# Patient Record
Sex: FEMALE | Race: WHITE | Employment: FULL TIME | URBAN - METROPOLITAN AREA
[De-identification: names, ages, dates, MRNs, and addresses within clinical notes are randomized per-mention and may not be internally consistent; named-entity substitution may affect disease eponyms.]

---

## 2019-02-11 ENCOUNTER — HOSPITAL ENCOUNTER (OUTPATIENT)
Age: 59
Setting detail: OUTPATIENT SURGERY
Discharge: HOME | End: 2019-02-11
Attending: COLON & RECTAL SURGERY | Admitting: COLON & RECTAL SURGERY
Payer: COMMERCIAL

## 2019-02-11 ENCOUNTER — ANESTHESIA EVENT (OUTPATIENT)
Dept: ENDOSCOPY | Age: 59
End: 2019-02-11
Payer: COMMERCIAL

## 2019-02-11 ENCOUNTER — ANESTHESIA (OUTPATIENT)
Dept: ENDOSCOPY | Age: 59
End: 2019-02-11
Payer: COMMERCIAL

## 2019-02-11 VITALS
TEMPERATURE: 97.8 F | WEIGHT: 150 LBS | HEIGHT: 62 IN | BODY MASS INDEX: 27.6 KG/M2 | DIASTOLIC BLOOD PRESSURE: 62 MMHG | RESPIRATION RATE: 16 BRPM | OXYGEN SATURATION: 100 % | SYSTOLIC BLOOD PRESSURE: 112 MMHG | HEART RATE: 56 BPM

## 2019-02-11 PROCEDURE — 76060000031 HC ANESTHESIA FIRST 0.5 HR: Performed by: COLON & RECTAL SURGERY

## 2019-02-11 PROCEDURE — 74011250636 HC RX REV CODE- 250/636

## 2019-02-11 PROCEDURE — 76040000019: Performed by: COLON & RECTAL SURGERY

## 2019-02-11 RX ORDER — LIDOCAINE HYDROCHLORIDE 20 MG/ML
INJECTION, SOLUTION EPIDURAL; INFILTRATION; INTRACAUDAL; PERINEURAL AS NEEDED
Status: DISCONTINUED | OUTPATIENT
Start: 2019-02-11 | End: 2019-02-11 | Stop reason: HOSPADM

## 2019-02-11 RX ORDER — NALOXONE HYDROCHLORIDE 0.4 MG/ML
0.4 INJECTION, SOLUTION INTRAMUSCULAR; INTRAVENOUS; SUBCUTANEOUS
Status: ACTIVE | OUTPATIENT
Start: 2019-02-11 | End: 2019-02-11

## 2019-02-11 RX ORDER — DEXTROMETHORPHAN/PSEUDOEPHED 2.5-7.5/.8
1.2 DROPS ORAL
Status: DISCONTINUED | OUTPATIENT
Start: 2019-02-11 | End: 2019-02-12 | Stop reason: HOSPADM

## 2019-02-11 RX ORDER — SODIUM CHLORIDE 0.9 % (FLUSH) 0.9 %
5-40 SYRINGE (ML) INJECTION AS NEEDED
Status: DISCONTINUED | OUTPATIENT
Start: 2019-02-11 | End: 2019-02-12 | Stop reason: HOSPADM

## 2019-02-11 RX ORDER — ACETAMINOPHEN 500 MG
TABLET ORAL
COMMUNITY
End: 2021-12-10

## 2019-02-11 RX ORDER — PROPOFOL 10 MG/ML
INJECTION, EMULSION INTRAVENOUS AS NEEDED
Status: DISCONTINUED | OUTPATIENT
Start: 2019-02-11 | End: 2019-02-11 | Stop reason: HOSPADM

## 2019-02-11 RX ORDER — LEVOTHYROXINE SODIUM 75 UG/1
TABLET ORAL
COMMUNITY
End: 2021-12-10 | Stop reason: SDUPTHER

## 2019-02-11 RX ORDER — ATROPINE SULFATE 0.1 MG/ML
0.5 INJECTION INTRAVENOUS
Status: DISCONTINUED | OUTPATIENT
Start: 2019-02-11 | End: 2019-02-12 | Stop reason: HOSPADM

## 2019-02-11 RX ORDER — FLUMAZENIL 0.1 MG/ML
0.2 INJECTION INTRAVENOUS
Status: ACTIVE | OUTPATIENT
Start: 2019-02-11 | End: 2019-02-11

## 2019-02-11 RX ORDER — SODIUM CHLORIDE 0.9 % (FLUSH) 0.9 %
5-40 SYRINGE (ML) INJECTION EVERY 8 HOURS
Status: DISCONTINUED | OUTPATIENT
Start: 2019-02-11 | End: 2019-02-12 | Stop reason: HOSPADM

## 2019-02-11 RX ORDER — PROPOFOL 10 MG/ML
INJECTION, EMULSION INTRAVENOUS
Status: DISCONTINUED | OUTPATIENT
Start: 2019-02-11 | End: 2019-02-11 | Stop reason: HOSPADM

## 2019-02-11 RX ADMIN — PROPOFOL 13 MG: 10 INJECTION, EMULSION INTRAVENOUS at 08:51

## 2019-02-11 RX ADMIN — PROPOFOL 100 MG: 10 INJECTION, EMULSION INTRAVENOUS at 08:50

## 2019-02-11 RX ADMIN — PROPOFOL 40 MG: 10 INJECTION, EMULSION INTRAVENOUS at 09:01

## 2019-02-11 RX ADMIN — LIDOCAINE HYDROCHLORIDE 100 MG: 20 INJECTION, SOLUTION EPIDURAL; INFILTRATION; INTRACAUDAL; PERINEURAL at 08:53

## 2019-02-11 RX ADMIN — PROPOFOL 100 MCG/KG/MIN: 10 INJECTION, EMULSION INTRAVENOUS at 08:50

## 2019-02-11 NOTE — PROCEDURES
Jean Paul Peterson [address]  PROCEDURE NOTE    Name:  Fredo Officer  MR#:  [id number]  :  1960  ACCOUNT #:  [de-identified]  DATE OF SERVICE:  2019    PREOPERATIVE DIAGNOSIS:  History of colon cancer. POSTOPERATIVE DIAGNOSIS:  Diverticulosis. PROCEDURE PERFORMED:  Colonoscopy. SURGEON:  Sabine Bradshaw MD    ASSISTANT:  Aicha Armendariz. ANESTHESIA:  MAC.    ESTIMATED BLOOD LOSS:  None. SPECIMENS REMOVED:  None. COMPLICATIONS:  None. IMPLANTS:  None. INDICATIONS:  The patient is 59year-old. She comes for surveillance because of  positive family history. PROCEDURE:  After mechanical bowel prep and intravenous sedation 320 mg of propofol,  the Olymposcope was introduced into the rectum and advanced under direct  visualization to the cecum. The ileocecal valve and cecal cap were well visualized. Good bowel prep. The scope was slowly withdrawn and mucosa inspected. No polyps or  dysplasia was seen. Minimal diverticulosis of the sigmoid without complication. The  rectum was normal.  The air was aspirated and the patient tolerated the procedure  well. There is minimal diverticulosis with positive family history of colon cancer. Recommend repeat in 5 years.       Agata Cardenas MD      [id number]  D:  2019 9:25  T:  2019 13:36  JOB #:  [id number]

## 2019-02-11 NOTE — ANESTHESIA POSTPROCEDURE EVALUATION
Procedure(s):  COLONOSCOPY.     Anesthesia Post Evaluation      Multimodal analgesia: multimodal analgesia not used between 6 hours prior to anesthesia start to PACU discharge  Patient location during evaluation: PACU  Patient participation: complete - patient participated  Level of consciousness: awake  Pain management: adequate  Airway patency: patent  Anesthetic complications: no  Cardiovascular status: acceptable, blood pressure returned to baseline and hemodynamically stable  Respiratory status: acceptable  Hydration status: acceptable        Visit Vitals  /54   Pulse (!) 58   Temp 36.6 °C (97.8 °F)   Resp 17   Ht 5' 2\" (1.575 m)   Wt 68 kg (150 lb)   SpO2 98%   BMI 27.44 kg/m²

## 2019-02-11 NOTE — H&P
Gastroenterology Outpatient History and Physical    Patient: Amy Lagos    Physician: Adella Siemens, MD    Vital Signs: Blood pressure 138/72, pulse 77, temperature 99.7 °F (37.6 °C), resp. rate 17, height 5' 2\" (1.575 m), weight 68 kg (150 lb), SpO2 100 %. Allergies: Allergies   Allergen Reactions    Iodinated Contrast- Oral And Iv Dye Rash       Chief Complaint: positive family history    History of Present Illness: as above    Justification for Procedure: as above    History:  Past Medical History:   Diagnosis Date    Ill-defined condition     Kidney stones    Thyroid disease       Past Surgical History:   Procedure Laterality Date    HX GYN      2009 D/C      Social History     Socioeconomic History    Marital status:      Spouse name: Not on file    Number of children: Not on file    Years of education: Not on file    Highest education level: Not on file   Tobacco Use    Smoking status: Former Smoker    Smokeless tobacco: Never Used   Substance and Sexual Activity    Alcohol use: Yes     Comment: occationally    Drug use: No    History reviewed. No pertinent family history. Medications:   Prior to Admission medications    Medication Sig Start Date End Date Taking? Authorizing Provider   levothyroxine (SYNTHROID) 75 mcg tablet Take  by mouth Daily (before breakfast). Yes Provider, Historical   acetaminophen (TYLENOL EXTRA STRENGTH) 500 mg tablet Take  by mouth every six (6) hours as needed for Pain. Yes Provider, Historical       Physical Exam:   General: alert, no distress   HEENT: Head: Normocephalic, no lesions, without obvious abnormality.    Heart: regular rate and rhythm, S1, S2 normal, no murmur, click, rub or gallop   Lungs: chest clear, no wheezing, rales, normal symmetric air entry   Abdominal: Bowel sounds are normal, liver is not enlarged, spleen is not enlarged   Neurological: Grossly normal   Extremities: extremities normal, atraumatic, no cyanosis or edema Findings/Diagnosis: as above    Plan of Care/Planned Procedure: colonoscopy    Signed By: Richie Reyes MD     February 11, 2019

## 2019-02-11 NOTE — ROUTINE PROCESS
[facility]  1960  [id number]    Situation:  Verbal report received from: Walker Manifold  Procedure: Procedure(s):  COLONOSCOPY    Background:    Preoperative diagnosis: SCREENING  Postoperative diagnosis: Diverticulosis    :  Dr. Sergio Rosas  Assistant(s): Endoscopy RN-1: Damien Yeh RN  Endoscopy RN-2: Chad Estrada RN    Specimens: * No specimens in log *  H. Pylori  no    Assessment:  Intra-procedure medications   Anesthesia gave intra-procedure sedation and medications, see anesthesia flow sheet yes    Intravenous fluids: NS@ KVO     Vital signs stable     Abdominal assessment: round and soft     Recommendation:  Discharge patient per MD order.   Return to floor  Family or Friend   Permission to share finding with family or friend yes

## 2019-02-11 NOTE — ANESTHESIA PREPROCEDURE EVALUATION
Anesthetic History   No history of anesthetic complications            Review of Systems / Medical History  Patient summary reviewed, nursing notes reviewed and pertinent labs reviewed    Pulmonary  Within defined limits                 Neuro/Psych   Within defined limits           Cardiovascular  Within defined limits                Exercise tolerance: >4 METS     GI/Hepatic/Renal  Within defined limits       Renal disease: stones       Endo/Other  Within defined limits    Hypothyroidism       Other Findings              Physical Exam    Airway  Mallampati: II    Neck ROM: normal range of motion   Mouth opening: Normal     Cardiovascular  Regular rate and rhythm,  S1 and S2 normal,  no murmur, click, rub, or gallop  Rhythm: regular  Rate: normal         Dental  No notable dental hx       Pulmonary  Breath sounds clear to auscultation               Abdominal  GI exam deferred       Other Findings            Anesthetic Plan    ASA: 2  Anesthesia type: MAC          Induction: Intravenous  Anesthetic plan and risks discussed with: Patient

## 2019-02-11 NOTE — PROCEDURES
[facility] Brief Procedure Note    Pia Chacon  1960  [id number]    Date of Procedure: 2/11/2019    Preoperative diagnosis: SCREENING    Postoperative diagnosis: Diverticulosis    Procedure: Procedure(s):  COLONOSCOPY    :  Dr. Anahi Sherman MD    Assistant(s): Endoscopy RN-1: Dilcia Crawford RN  Endoscopy RN-2: Carter Avila RN    EBL:None    Anesthesia/Sedation: Moderate sedation / MAC    Specimens: * No specimens in log *    Findings: diverticulosis    Complications: None    Dr. Anahi Sherman MD  2/11/2019  9:11 AM

## 2019-02-11 NOTE — DISCHARGE INSTRUCTIONS
Cheko Jalloh  [id number]  1960    COLON DISCHARGE INSTRUCTIONS  Discomfort:  Redness at IV site- apply warm compress to area; if redness or soreness persist- contact your physician  There may be a slight amount of blood passed from the rectum  Gaseous discomfort- walking, belching will help relieve any discomfort  You may not operate a vehicle for 12 hours  You may not engage in an occupation involving machinery or appliances for rest of today  You may not drink alcoholic beverages for at least 12 hours  Avoid making any critical decisions for at least 24 hour  DIET:   High fiber diet. - however -  remember your colon is empty and a heavy meal will produce gas. Avoid these foods:  vegetables, fried / greasy foods, carbonated drinks for today    MEDICATIONS:           ACTIVITY:  You may resume your normal daily activities it is recommended that you spend the remainder of the day resting -  avoid any strenuous activity. CALL M.D. ANY SIGN OF:   Increasing pain, nausea, vomiting  Abdominal distension (swelling)  New increased bleeding (oral or rectal)  Fever (chills)  Pain in chest area  Bloody discharge from nose or mouth  Shortness of breath     Follow-up Instructions:   Call Aditya Medina MD if any questions or problems. Telephone # [phone]  Should have a repeat colonoscopy in 5 years. COLONOSCOPY FINDINGS:  Your colonoscopy showed: diverticulosis.

## 2019-09-03 ENCOUNTER — HOSPITAL ENCOUNTER (OUTPATIENT)
Dept: MRI IMAGING | Age: 59
Discharge: HOME | End: 2019-09-03
Attending: ORTHOPAEDIC SURGERY
Payer: COMMERCIAL

## 2019-09-03 DIAGNOSIS — S83.242A ACUTE MEDIAL MENISCAL TEAR, LEFT, INITIAL ENCOUNTER: ICD-10-CM

## 2019-09-03 PROCEDURE — 73721 MRI JNT OF LWR EXTRE W/O DYE: CPT

## 2020-06-19 ENCOUNTER — HOSPITAL ENCOUNTER (OUTPATIENT)
Dept: MAMMOGRAPHY | Age: 60
Discharge: HOME | End: 2020-06-19
Attending: OBSTETRICS & GYNECOLOGY
Payer: COMMERCIAL

## 2020-06-19 DIAGNOSIS — Z12.31 VISIT FOR SCREENING MAMMOGRAM: ICD-10-CM

## 2020-06-19 PROCEDURE — 77063 BREAST TOMOSYNTHESIS BI: CPT

## 2020-07-16 ENCOUNTER — NURSE TRIAGE (OUTPATIENT)
Dept: OTHER | Facility: CLINIC | Age: 60
End: 2020-07-16

## 2020-07-16 NOTE — TELEPHONE ENCOUNTER
Reason for Disposition   [1] COVID-19 infection suspected by caller or triager AND [2] mild symptoms (cough, fever, or others) AND [5] no complications or SOB    Answer Assessment - Initial Assessment Questions  1. COVID-19 DIAGNOSIS: \"Who made your Coronavirus (COVID-19) diagnosis? \" \"Was it confirmed by a positive lab test?\" If not diagnosed by a HCP, ask \"Are there lots of cases (community spread) where you live? \" (See public health department website, if unsure)      n/a  2. ONSET: \"When did the COVID-19 symptoms start? \"       Last Tuesday  3. WORST SYMPTOM: \"What is your worst symptom? \" (e.g., cough, fever, shortness of breath, muscle aches)      Headache  4. COUGH: \"Do you have a cough? \" If so, ask: \"How bad is the cough? \"        No cough  5. FEVER: \"Do you have a fever? \" If so, ask: \"What is your temperature, how was it measured, and when did it start? \"      No fever  6. RESPIRATORY STATUS: \"Describe your breathing? \" (e.g., shortness of breath, wheezing, unable to speak)       No breathing problems  7. BETTER-SAME-WORSE: Arminda Clayton you getting better, staying the same or getting worse compared to yesterday? \"  If getting worse, ask, \"In what way? \"      same  8. HIGH RISK DISEASE: \"Do you have any chronic medical problems? \" (e.g., asthma, heart or lung disease, weak immune system, etc.)      none  9. PREGNANCY: \"Is there any chance you are pregnant? \" \"When was your last menstrual period? \"      no  10. OTHER SYMPTOMS: \"Do you have any other symptoms? \"  (e.g., chills, fatigue, headache, loss of smell or taste, muscle pain, sore throat)        Headache, fatigue,  Loss of taste    Protocols used: CORONAVIRUS (COVID-19) DIAGNOSED OR SUSPECTED-ADULT-AH  Patient/employee c/o of loss of taste, fatigue and headache. Recommended patient contact provider. Provided Occupational Health provider information and Associate Health Nurse information. Encourged patient to remain in contact with her direct supervisor.   Care advice provided.

## 2020-09-18 ENCOUNTER — EMPLOYEE WELLNESS (OUTPATIENT)
Dept: OTHER | Facility: CLINIC | Age: 60
End: 2020-09-18

## 2020-09-19 LAB
CHOLEST SERPL-MCNC: 195 MG/DL
GLUCOSE SERPL-MCNC: 97 MG/DL (ref 65–100)
HDLC SERPL-MCNC: 53 MG/DL
LDLC SERPL CALC-MCNC: 120.4 MG/DL (ref 0–100)
TRIGL SERPL-MCNC: 108 MG/DL (ref ?–150)

## 2021-06-21 LAB
CHOLEST SERPL-MCNC: 202 MG/DL
GLUCOSE SERPL-MCNC: 96 MG/DL (ref 65–100)
HDLC SERPL-MCNC: 55 MG/DL
LDLC SERPL CALC-MCNC: 124.6 MG/DL (ref 0–100)
TRIGL SERPL-MCNC: 112 MG/DL (ref ?–150)

## 2021-10-04 ENCOUNTER — TRANSCRIBE ORDER (OUTPATIENT)
Dept: SCHEDULING | Age: 61
End: 2021-10-04

## 2021-10-04 DIAGNOSIS — Z12.31 SCREENING MAMMOGRAM FOR HIGH-RISK PATIENT: Primary | ICD-10-CM

## 2021-10-08 ENCOUNTER — HOSPITAL ENCOUNTER (OUTPATIENT)
Dept: MAMMOGRAPHY | Age: 61
Discharge: HOME | End: 2021-10-08
Attending: OBSTETRICS & GYNECOLOGY
Payer: COMMERCIAL

## 2021-10-08 DIAGNOSIS — Z12.31 SCREENING MAMMOGRAM FOR HIGH-RISK PATIENT: ICD-10-CM

## 2021-10-08 PROCEDURE — 77063 BREAST TOMOSYNTHESIS BI: CPT

## 2021-10-08 PROCEDURE — 77067 SCR MAMMO BI INCL CAD: CPT

## 2021-10-11 ENCOUNTER — TELEPHONE (OUTPATIENT)
Dept: INTERNAL MEDICINE CLINIC | Age: 61
End: 2021-10-11

## 2021-10-11 NOTE — TELEPHONE ENCOUNTER
Patient had to cancel her new patient appointment for 10/19 and would like to know if the doctor can see her sometime in December instead. PSR told her he would reach out to the nurse as right now nothing is available until February.

## 2021-10-18 NOTE — TELEPHONE ENCOUNTER
PSR lvm for patient letting them know when the doctor was next available for a new patient appointment and to call us to schedule if she wanted the appointment.

## 2021-12-10 ENCOUNTER — OFFICE VISIT (OUTPATIENT)
Dept: INTERNAL MEDICINE CLINIC | Age: 61
End: 2021-12-10
Payer: COMMERCIAL

## 2021-12-10 VITALS
HEIGHT: 62 IN | DIASTOLIC BLOOD PRESSURE: 82 MMHG | TEMPERATURE: 98.1 F | OXYGEN SATURATION: 98 % | WEIGHT: 145 LBS | HEART RATE: 64 BPM | SYSTOLIC BLOOD PRESSURE: 145 MMHG | BODY MASS INDEX: 26.68 KG/M2 | RESPIRATION RATE: 14 BRPM

## 2021-12-10 DIAGNOSIS — G89.29 CHRONIC PAIN OF LEFT KNEE: ICD-10-CM

## 2021-12-10 DIAGNOSIS — J06.9 UPPER RESPIRATORY TRACT INFECTION, UNSPECIFIED TYPE: Primary | ICD-10-CM

## 2021-12-10 DIAGNOSIS — M25.562 CHRONIC PAIN OF LEFT KNEE: ICD-10-CM

## 2021-12-10 DIAGNOSIS — E03.9 ACQUIRED HYPOTHYROIDISM: ICD-10-CM

## 2021-12-10 DIAGNOSIS — Z00.00 ROUTINE ADULT HEALTH MAINTENANCE: ICD-10-CM

## 2021-12-10 DIAGNOSIS — Z11.59 NEED FOR HEPATITIS C SCREENING TEST: ICD-10-CM

## 2021-12-10 DIAGNOSIS — D22.9 MULTIPLE NEVI: ICD-10-CM

## 2021-12-10 DIAGNOSIS — R03.0 ELEVATED BLOOD PRESSURE READING: ICD-10-CM

## 2021-12-10 DIAGNOSIS — E55.9 VITAMIN D DEFICIENCY: ICD-10-CM

## 2021-12-10 PROBLEM — Z87.442 HISTORY OF KIDNEY STONES: Status: ACTIVE | Noted: 2021-12-10

## 2021-12-10 PROBLEM — I10 PRIMARY HYPERTENSION: Status: ACTIVE | Noted: 2021-12-10

## 2021-12-10 PROCEDURE — 99204 OFFICE O/P NEW MOD 45 MIN: CPT | Performed by: INTERNAL MEDICINE

## 2021-12-10 RX ORDER — LEVOTHYROXINE SODIUM 75 UG/1
75 TABLET ORAL
Qty: 90 TABLET | Refills: 3 | Status: SHIPPED | OUTPATIENT
Start: 2021-12-10

## 2021-12-10 RX ORDER — AMOXICILLIN AND CLAVULANATE POTASSIUM 875; 125 MG/1; MG/1
1 TABLET, FILM COATED ORAL EVERY 12 HOURS
COMMUNITY
Start: 2021-12-04 | End: 2022-09-14 | Stop reason: ALTCHOICE

## 2021-12-10 RX ORDER — ERGOCALCIFEROL 1.25 MG/1
CAPSULE ORAL
COMMUNITY
Start: 2021-09-09 | End: 2022-03-14

## 2021-12-10 NOTE — PATIENT INSTRUCTIONS
Monitor your blood pressure at home after you're feeling better. Send us a message in a month with the readings (or give us a call).

## 2021-12-10 NOTE — ASSESSMENT & PLAN NOTE
Went to [facility] last weekend  COVID test negative  Given augmentin  Feeling better, sore throat is gone, sinus congestion better but still present  Augmentin makes her tired so was only to take 1 tablet yesterday and today. Finish out antibiotics over the weekend then stop.   If symptoms recur, advised to let us know so we can change her antibiotics

## 2021-12-10 NOTE — ASSESSMENT & PLAN NOTE
Noted high at [facility]  High at dentist   Has had high BP at doc offices in the past   Family history of HTN  She would like to avoid medications if possible. Recommend monitoring at home after she recovers from her illness and sending us a message with her blood pressure readings in a month. If elevated, we discussed medications including amlodipine, losartan, hydrochlorothiazide. She would like to avoid hydrochlorothiazide due to diuretic effect.

## 2021-12-10 NOTE — ASSESSMENT & PLAN NOTE
unclear control, continue current medications pending work up below   levothyrox 75mcg   Takes it on an empty stomach   Advised to get labs done next month when she is no longer sick for more accurate lab tests

## 2021-12-10 NOTE — PROGRESS NOTES
Assessment and Plan     1. Upper respiratory tract infection, unspecified type  Assessment & Plan:   Went to [facility] last weekend  COVID test negative  Given augmentin  Feeling better, sore throat is gone, sinus congestion better but still present  Augmentin makes her tired so was only to take 1 tablet yesterday and today. Finish out antibiotics over the weekend then stop. If symptoms recur, advised to let us know so we can change her antibiotics  2. Vitamin D deficiency  Assessment & Plan:   unclear control, continue current medications pending work up below   On ergocalciferol weekly  Orders:  -     VITAMIN D, 25 HYDROXY; Future  3. Acquired hypothyroidism  Assessment & Plan:  unclear control, continue current medications pending work up below   levothyrox 75mcg   Takes it on an empty stomach   Advised to get labs done next month when she is no longer sick for more accurate lab tests  Orders:  -     CBC W/O DIFF; Future  -     METABOLIC PANEL, COMPREHENSIVE; Future  -     HEMOGLOBIN A1C WITH EAG; Future  -     LIPID PANEL; Future  -     TSH 3RD GENERATION; Future  -     levothyroxine (SYNTHROID) 75 mcg tablet; Take 1 Tablet by mouth Daily (before breakfast). Indications: low thyroid, Normal, Disp-90 Tablet, R-3  4. Need for hepatitis C screening test  -     HEPATITIS C AB; Future  5. Elevated blood pressure reading  Assessment & Plan:  Noted high at [facility]  High at dentist   Has had high BP at doc offices in the past   Family history of HTN  She would like to avoid medications if possible. Recommend monitoring at home after she recovers from her illness and sending us a message with her blood pressure readings in a month. If elevated, we discussed medications including amlodipine, losartan, hydrochlorothiazide. She would like to avoid hydrochlorothiazide due to diuretic effect. 6. Chronic pain of left knee  Assessment & Plan: Torn meniscus  monitor  7.  Routine adult health maintenance  Assessment & Plan:  Last pap over a year ago - followed by [facility]. Plans to possibly switch to here  Discussed shingles vaccine   Thinks had TDAP in the past 10 years   Walks a lot at work, tries to eat healthy   8. Multiple nevi  Assessment & Plan:  Followed by dermatology. Has had moles removed previously       Benefits, risks, possible drug interactions, and side effects of all new medications were reviewed with the patient. Pt verbalized understanding. Return to clinic:  Pending BP readings    An electronic signature was used to authenticate this note. Prem Daly MD  [facility]  12/10/2021    No future appointments. History of Present Illness   Chief Complaint   Establish care    Che Washington is a 59 y.o. female         Review of Systems   Constitutional: Negative for chills and fever. HENT: Negative for hearing loss. Eyes: Negative for blurred vision. Respiratory: Negative for shortness of breath. Cardiovascular: Negative for chest pain. Gastrointestinal: Negative for abdominal pain, blood in stool, constipation, diarrhea, melena, nausea and vomiting. Genitourinary: Negative for dysuria and hematuria. Musculoskeletal: Positive for joint pain. Skin: Negative for rash. Neurological: Negative for headaches. Past Medical History     Allergies   Allergen Reactions    Iodinated Contrast Media Rash        Current Outpatient Medications   Medication Sig    ergocalciferol (ERGOCALCIFEROL) 1,250 mcg (50,000 unit) capsule TAKE 1 CAPSULE BY MOUTH 1 TIME A WEEK    amoxicillin-clavulanate (AUGMENTIN) 875-125 mg per tablet Take 1 Tablet by mouth every twelve (12) hours.  levothyroxine (SYNTHROID) 75 mcg tablet Take 1 Tablet by mouth Daily (before breakfast). Indications: low thyroid     No current facility-administered medications for this visit.           Patient Active Problem List   Diagnosis Code    Vitamin D deficiency E55.9    Acquired hypothyroidism E03.9    URI (upper respiratory infection) J06.9    History of kidney stones Z87.442    Elevated blood pressure reading R03.0    Chronic pain of left knee M25.562, G89.29    Routine adult health maintenance Z00.00    Multiple nevi D22.9     Past Surgical History:   Procedure Laterality Date    COLONOSCOPY N/A 2/11/2019    COLONOSCOPY performed by Myriam Rogers MD at [facility] HX CYST REMOVAL Left 2000    cyst removel between heart and lung    HX GYN      2009 D/C      Social History     Tobacco Use    Smoking status: Never Smoker    Smokeless tobacco: Never Used   Substance Use Topics    Alcohol use: Yes     Comment: 2 drinks/week      Family History   Problem Relation Age of Onset    Breast Cancer Mother 79    Hypertension Mother     High Cholesterol Mother     Heart Disease Father         history of rheumatic fever    Hypertension Father     Hypertension Sister     Hypertension Brother     Diabetes Neg Hx     Stroke Neg Hx     Heart Attack Neg Hx         Physical Exam   Vitals:       Visit Vitals  BP (!) 145/82 (BP 1 Location: Left upper arm, BP Patient Position: Sitting, BP Cuff Size: Adult)   Pulse 64   Temp 98.1 °F (36.7 °C) (Temporal)   Resp 14   Ht 5' 2\" (1.575 m)   Wt 145 lb (65.8 kg)   SpO2 98%   BMI 26.52 kg/m²        Physical Exam  Constitutional:       General: She is not in acute distress. Appearance: She is well-developed. HENT:      Right Ear: Tympanic membrane, ear canal and external ear normal.      Left Ear: Tympanic membrane, ear canal and external ear normal.   Eyes:      Extraocular Movements: Extraocular movements intact. Conjunctiva/sclera: Conjunctivae normal.   Neck:      Vascular: No carotid bruit. Cardiovascular:      Rate and Rhythm: Normal rate and regular rhythm. Pulses: Normal pulses. Heart sounds: No murmur heard. No friction rub. No gallop. Pulmonary:      Effort: No respiratory distress.       Breath sounds: No wheezing, rhonchi or rales. Abdominal:      General: Bowel sounds are normal. There is no distension. Palpations: Abdomen is soft. There is no hepatomegaly, splenomegaly or mass. Tenderness: There is no abdominal tenderness. There is no guarding. Musculoskeletal:      Cervical back: Neck supple. Right lower leg: No edema. Left lower leg: No edema. Lymphadenopathy:      Cervical: No cervical adenopathy. Skin:     General: Skin is warm. Findings: No rash. Neurological:      Mental Status: She is alert.

## 2021-12-10 NOTE — ASSESSMENT & PLAN NOTE
Last pap over a year ago - followed by [facility].  Plans to possibly switch to here  Discussed shingles vaccine   Thinks had TDAP in the past 10 years   Walks a lot at work, tries to eat healthy

## 2022-03-11 ENCOUNTER — TELEPHONE (OUTPATIENT)
Dept: INTERNAL MEDICINE CLINIC | Age: 62
End: 2022-03-11

## 2022-03-11 RX ORDER — AMOXICILLIN AND CLAVULANATE POTASSIUM 875; 125 MG/1; MG/1
1 TABLET, FILM COATED ORAL EVERY 12 HOURS
Qty: 30 TABLET | Refills: 0 | OUTPATIENT
Start: 2022-03-11

## 2022-03-11 NOTE — TELEPHONE ENCOUNTER
Patient called to state her pharmacy would like to speak with our office before they can refill her vitamin D medication. Patient also states she thinks she may need a refill of her thyroid medication and to make sure it is the [employer] brand.

## 2022-03-14 RX ORDER — ERGOCALCIFEROL 1.25 MG/1
CAPSULE ORAL
Qty: 4 CAPSULE | Refills: 0 | Status: SHIPPED | OUTPATIENT
Start: 2022-03-14 | End: 2022-04-10

## 2022-03-14 RX ORDER — ERGOCALCIFEROL 1.25 MG/1
CAPSULE ORAL
Qty: 30 CAPSULE | Refills: 0 | Status: CANCELLED | OUTPATIENT
Start: 2022-03-14

## 2022-03-18 PROBLEM — J06.9 URI (UPPER RESPIRATORY INFECTION): Status: ACTIVE | Noted: 2021-12-10

## 2022-03-18 PROBLEM — E03.9 ACQUIRED HYPOTHYROIDISM: Status: ACTIVE | Noted: 2021-12-10

## 2022-03-19 PROBLEM — E55.9 VITAMIN D DEFICIENCY: Status: ACTIVE | Noted: 2021-12-10

## 2022-03-19 PROBLEM — R03.0 ELEVATED BLOOD PRESSURE READING: Status: ACTIVE | Noted: 2021-12-10

## 2022-03-19 PROBLEM — D22.9 MULTIPLE NEVI: Status: ACTIVE | Noted: 2021-12-10

## 2022-03-19 PROBLEM — Z00.00 ROUTINE ADULT HEALTH MAINTENANCE: Status: ACTIVE | Noted: 2021-12-10

## 2022-03-20 PROBLEM — Z87.442 HISTORY OF KIDNEY STONES: Status: ACTIVE | Noted: 2021-12-10

## 2022-03-20 PROBLEM — G89.29 CHRONIC PAIN OF LEFT KNEE: Status: ACTIVE | Noted: 2021-12-10

## 2022-03-20 PROBLEM — M25.562 CHRONIC PAIN OF LEFT KNEE: Status: ACTIVE | Noted: 2021-12-10

## 2022-04-10 RX ORDER — ERGOCALCIFEROL 1.25 MG/1
CAPSULE ORAL
Qty: 12 CAPSULE | Refills: 1 | Status: SHIPPED | OUTPATIENT
Start: 2022-04-10 | End: 2022-09-14 | Stop reason: SDUPTHER

## 2022-09-13 ENCOUNTER — TELEPHONE (OUTPATIENT)
Dept: INTERNAL MEDICINE CLINIC | Age: 62
End: 2022-09-13

## 2022-09-13 NOTE — TELEPHONE ENCOUNTER
I called pt, no answer. LM on VM that her appt tomorrow is schedule for the be well screening only.  If pt needs a complete physical please call back to r/s appt to allow for enough time for a physical .

## 2022-09-14 ENCOUNTER — OFFICE VISIT (OUTPATIENT)
Dept: INTERNAL MEDICINE CLINIC | Age: 62
End: 2022-09-14
Payer: COMMERCIAL

## 2022-09-14 VITALS
SYSTOLIC BLOOD PRESSURE: 136 MMHG | WEIGHT: 152 LBS | DIASTOLIC BLOOD PRESSURE: 86 MMHG | BODY MASS INDEX: 27.97 KG/M2 | HEIGHT: 62 IN | OXYGEN SATURATION: 99 % | RESPIRATION RATE: 16 BRPM | TEMPERATURE: 97.8 F | HEART RATE: 59 BPM

## 2022-09-14 DIAGNOSIS — R03.0 ELEVATED BLOOD PRESSURE READING WITHOUT DIAGNOSIS OF HYPERTENSION: Primary | ICD-10-CM

## 2022-09-14 DIAGNOSIS — Z23 ENCOUNTER FOR IMMUNIZATION: ICD-10-CM

## 2022-09-14 DIAGNOSIS — E55.9 VITAMIN D DEFICIENCY: ICD-10-CM

## 2022-09-14 DIAGNOSIS — E03.9 ACQUIRED HYPOTHYROIDISM: ICD-10-CM

## 2022-09-14 LAB
25(OH)D3 SERPL-MCNC: 69.6 NG/ML (ref 30–100)
ALBUMIN SERPL-MCNC: 4.3 G/DL (ref 3.5–5)
ALBUMIN/GLOB SERPL: 1.4 {RATIO} (ref 1.1–2.2)
ALP SERPL-CCNC: 56 U/L (ref 45–117)
ALT SERPL-CCNC: 22 U/L (ref 12–78)
ANION GAP SERPL CALC-SCNC: 3 MMOL/L (ref 5–15)
AST SERPL-CCNC: 12 U/L (ref 15–37)
BILIRUB SERPL-MCNC: 0.4 MG/DL (ref 0.2–1)
BUN SERPL-MCNC: 15 MG/DL (ref 6–20)
BUN/CREAT SERPL: 29 (ref 12–20)
CALCIUM SERPL-MCNC: 9.4 MG/DL (ref 8.5–10.1)
CHLORIDE SERPL-SCNC: 105 MMOL/L (ref 97–108)
CHOLEST SERPL-MCNC: 183 MG/DL
CO2 SERPL-SCNC: 30 MMOL/L (ref 21–32)
CREAT SERPL-MCNC: 0.52 MG/DL (ref 0.55–1.02)
ERYTHROCYTE [DISTWIDTH] IN BLOOD BY AUTOMATED COUNT: 12.2 % (ref 11.5–14.5)
EST. AVERAGE GLUCOSE BLD GHB EST-MCNC: 111 MG/DL
GLOBULIN SER CALC-MCNC: 3 G/DL (ref 2–4)
GLUCOSE SERPL-MCNC: 93 MG/DL (ref 65–100)
HBA1C MFR BLD: 5.5 % (ref 4–5.6)
HCT VFR BLD AUTO: 43.2 % (ref 35–47)
HCV AB SERPL QL IA: NONREACTIVE
HDLC SERPL-MCNC: 53 MG/DL
HDLC SERPL: 3.5 {RATIO} (ref 0–5)
HGB BLD-MCNC: 14.3 G/DL (ref 11.5–16)
LDLC SERPL CALC-MCNC: 109.4 MG/DL (ref 0–100)
MCH RBC QN AUTO: 29.6 PG (ref 26–34)
MCHC RBC AUTO-ENTMCNC: 33.1 G/DL (ref 30–36.5)
MCV RBC AUTO: 89.4 FL (ref 80–99)
NRBC # BLD: 0 K/UL (ref 0–0.01)
NRBC BLD-RTO: 0 PER 100 WBC
PLATELET # BLD AUTO: 209 K/UL (ref 150–400)
PMV BLD AUTO: 12.3 FL (ref 8.9–12.9)
POTASSIUM SERPL-SCNC: 4.1 MMOL/L (ref 3.5–5.1)
PROT SERPL-MCNC: 7.3 G/DL (ref 6.4–8.2)
RBC # BLD AUTO: 4.83 M/UL (ref 3.8–5.2)
SODIUM SERPL-SCNC: 138 MMOL/L (ref 136–145)
TRIGL SERPL-MCNC: 103 MG/DL (ref ?–150)
TSH SERPL DL<=0.05 MIU/L-ACNC: 1.41 UIU/ML (ref 0.36–3.74)
VLDLC SERPL CALC-MCNC: 20.6 MG/DL
WBC # BLD AUTO: 6.5 K/UL (ref 3.6–11)

## 2022-09-14 PROCEDURE — 90715 TDAP VACCINE 7 YRS/> IM: CPT | Performed by: NURSE PRACTITIONER

## 2022-09-14 PROCEDURE — 99214 OFFICE O/P EST MOD 30 MIN: CPT | Performed by: NURSE PRACTITIONER

## 2022-09-14 PROCEDURE — 90471 IMMUNIZATION ADMIN: CPT | Performed by: NURSE PRACTITIONER

## 2022-09-14 RX ORDER — ERGOCALCIFEROL 1.25 MG/1
50000 CAPSULE ORAL
Qty: 12 CAPSULE | Refills: 1 | Status: SHIPPED | OUTPATIENT
Start: 2022-09-14

## 2022-09-14 NOTE — PROGRESS NOTES
Jagdish Maurice (: 1960) is a 59 y.o. female, established patient, here for evaluation of the following chief complaint(s):  Labs (Fasting) and Employment Physical (Be Well)       ASSESSMENT/PLAN:  Below is the assessment and plan developed based on review of pertinent history, physical exam, labs, studies, and medications. 1. Elevated blood pressure reading without diagnosis of hypertension --blood pressure mildly elevated upon recheck with manual cuff; encouraged her to check blood pressures daily for 2 weeks alternating between a.m. and p.m. and report results to office. Reprinted labs from 2021 visit and will have drawn today. 2. Acquired hypothyroidism --we will check TSH levels today. 3. Vitamin D deficiency  -     ergocalciferol (ERGOCALCIFEROL) 1,250 mcg (50,000 unit) capsule; Take 1 Capsule by mouth every seven (7) days. , Normal, Disp-12 Capsule, R-1    4. Encounter for immunization --Tdap booster given in office  -     IA IMMUNIZ ADMIN,1 SINGLE/COMB VAC/TOXOID  -     TDAP, BOOSTRIX, (AGE 10 YRS+), IM      Will notify patient of lab results and she will fax over her biometric screening form to be completed and faxed in on her behalf. She is planning to return in December for complete physical with PCP. SUBJECTIVE/OBJECTIVE:  HPI    Patient of Dr. Juan Chandra presents to office for biometric screening and fasting labs. Was last seen in 2021 when she established with PCP, Dr. Juan Chandra. Reports her blood pressure was running slightly elevated at that time and she was asked to check her readings and report back to office but admits that she did not comply. Has automatic blood pressure cuff at home. Blood pressure reading in office upon arrival elevated with automatic cuff but improved with manual cuff. Denies headaches, shortness of breath, chest discomfort, flushing, vision changes. SUBJECTIVE: Jagdish Maurice is a 59 y.o. female here for follow up of hypothyroidism. Currently on levothyroxine 75 mcg daily and has been on this dose for years. She is due for labs. No results found for: TSH, TSH2, TSH3, TSHP, TSHEXT  Thyroid ROS: denies fatigue, weight changes, heat/cold intolerance, bowel/skin changes or CVS symptoms. Has been vitamin D deficient for years and has been on weekly supplementation.     Patient Active Problem List   Diagnosis Code    Vitamin D deficiency E55.9    Acquired hypothyroidism E03.9    URI (upper respiratory infection) J06.9    History of kidney stones Z87.442    Elevated blood pressure reading R03.0    Chronic pain of left knee M25.562, G89.29    Routine adult health maintenance Z00.00    Multiple nevi D22.9     Past Surgical History:   Procedure Laterality Date    COLONOSCOPY N/A 2/11/2019    COLONOSCOPY performed by Veronica Seip, MD at [facility] ENDOSCOPY    HX CYST REMOVAL Left 2000    cyst removel between heart and lung    HX GYN      2009 D/C     Social History     Socioeconomic History    Marital status:      Spouse name: Not on file    Number of children: Not on file    Years of education: Not on file    Highest education level: Not on file   Occupational History    Not on file   Tobacco Use    Smoking status: Never    Smokeless tobacco: Never   Vaping Use    Vaping Use: Never used   Substance and Sexual Activity    Alcohol use: Yes     Comment: 2 drinks/week    Drug use: No    Sexual activity: Yes     Partners: Male     Birth control/protection: None   Other Topics Concern    Not on file   Social History Narrative    Not on file     Social Determinants of Health     Financial Resource Strain: Not on file   Food Insecurity: Not on file   Transportation Needs: Not on file   Physical Activity: Not on file   Stress: Not on file   Social Connections: Not on file   Intimate Partner Violence: Not on file   Housing Stability: Not on file     Family History   Problem Relation Age of Onset    Breast Cancer Mother 79    Hypertension Mother High Cholesterol Mother     Heart Disease Father         history of rheumatic fever    Hypertension Father     Hypertension Sister     Hypertension Brother     Diabetes Neg Hx     Stroke Neg Hx     Heart Attack Neg Hx      Current Outpatient Medications   Medication Sig    ergocalciferol (ERGOCALCIFEROL) 1,250 mcg (50,000 unit) capsule Take 1 Capsule by mouth every seven (7) days. levothyroxine (SYNTHROID) 75 mcg tablet Take 1 Tablet by mouth Daily (before breakfast). Indications: low thyroid     No current facility-administered medications for this visit. Allergies   Allergen Reactions    Iodinated Contrast Media Rash     Immunization History   Administered Date(s) Administered    COVID-19, MODERNA BLUE border, Primary or Immunocompromised, (age 18y+), IM, 100 mcg/0.5mL 01/07/2021, 02/04/2021    Influenza Vaccine 10/08/2020, 10/25/2021, 10/29/2021    Tdap 09/14/2022           Review of Systems   Constitutional:  Negative for chills and fever. Respiratory:  Negative for cough, chest tightness and shortness of breath. Cardiovascular:  Negative for chest pain and leg swelling. Gastrointestinal:  Negative for abdominal pain. Musculoskeletal:  Negative for joint swelling. Skin:  Negative for rash. Neurological:  Negative for headaches. /86 (BP 1 Location: Left upper arm, BP Patient Position: Sitting, BP Cuff Size: Adult) Comment: Manual cuff  Pulse (!) 59   Temp 97.8 °F (36.6 °C) (Oral)   Resp 16   Ht 5' 2\" (1.575 m)   Wt 152 lb (68.9 kg)   SpO2 99%   BMI 27.80 kg/m²    waist measurement 33.5 inches    Physical Exam  Vitals and nursing note reviewed. Constitutional:       General: She is not in acute distress. Appearance: Normal appearance. HENT:      Head: Normocephalic and atraumatic. Cardiovascular:      Rate and Rhythm: Normal rate and regular rhythm. Pulmonary:      Effort: Pulmonary effort is normal.      Breath sounds: Normal breath sounds.    Musculoskeletal: Cervical back: Normal range of motion and neck supple. Neurological:      General: No focal deficit present. Mental Status: She is alert and oriented to person, place, and time. Psychiatric:         Mood and Affect: Mood normal.         Behavior: Behavior normal.             An electronic signature was used to authenticate this note.   -- Shawna Singh NP

## 2022-09-14 NOTE — PROGRESS NOTES
Patient present for routine immunizations. Pt denies any symptoms , reactions or allergies that would exclude them from being immunized today. Risks and adverse reactions were discussed and the VIS was given to them. All questions were addressed. Pt was observed for 10 min post injection. There were no reactions observed.     Darren Jackson LPN

## 2022-09-15 NOTE — PROGRESS NOTES
[employer] message sent. Vitamin D 69. Hep C negative. TSH normal.  .   A1c normal.  CMP normal.  CBC normal    Monitor cholesterol

## 2022-09-23 ENCOUNTER — TELEPHONE (OUTPATIENT)
Dept: INTERNAL MEDICINE CLINIC | Age: 62
End: 2022-09-23

## 2022-09-23 NOTE — TELEPHONE ENCOUNTER
I called pt no answer. Mailbox is full. Pt needs to sign her be well form. I will send pt a [employer] message.

## 2022-11-10 ENCOUNTER — HOSPITAL ENCOUNTER (OUTPATIENT)
Dept: MAMMOGRAPHY | Age: 62
Discharge: HOME | End: 2022-11-10
Attending: INTERNAL MEDICINE
Payer: COMMERCIAL

## 2022-11-10 ENCOUNTER — TRANSCRIBE ORDER (OUTPATIENT)
Dept: REGISTRATION | Age: 62
End: 2022-11-10

## 2022-11-10 DIAGNOSIS — Z12.31 OTHER SCREENING MAMMOGRAM: ICD-10-CM

## 2022-11-10 DIAGNOSIS — Z12.31 OTHER SCREENING MAMMOGRAM: Primary | ICD-10-CM

## 2022-11-10 PROCEDURE — 77063 BREAST TOMOSYNTHESIS BI: CPT

## 2022-12-01 DIAGNOSIS — E55.9 VITAMIN D DEFICIENCY: ICD-10-CM

## 2022-12-01 DIAGNOSIS — E03.9 ACQUIRED HYPOTHYROIDISM: ICD-10-CM

## 2022-12-01 DIAGNOSIS — E55.9 VITAMIN D DEFICIENCY: Primary | ICD-10-CM

## 2022-12-01 RX ORDER — ERGOCALCIFEROL 1.25 MG/1
50000 CAPSULE ORAL
Qty: 12 CAPSULE | Refills: 1 | OUTPATIENT
Start: 2022-12-01

## 2022-12-01 RX ORDER — MELATONIN
1000 DAILY
Qty: 90 TABLET | Refills: 1 | Status: SHIPPED | OUTPATIENT
Start: 2022-12-01 | End: 2022-12-02 | Stop reason: SDUPTHER

## 2022-12-01 RX ORDER — LEVOTHYROXINE SODIUM 75 UG/1
75 TABLET ORAL
Qty: 90 TABLET | Refills: 3 | Status: SHIPPED | OUTPATIENT
Start: 2022-12-01 | End: 2022-12-02 | Stop reason: SDUPTHER

## 2022-12-01 NOTE — TELEPHONE ENCOUNTER
----- Message from Jamilah Thomas sent at 11/29/2022  3:18 PM EST -----  Subject: Refill Request    QUESTIONS  Name of Medication? levothyroxine (SYNTHROID) 75 mcg tablet  Patient-reported dosage and instructions? once a day  How many days do you have left? 11  Preferred Pharmacy? [address] phone number (if available)? [phone]  Additional Information for Provider? Pt can only have the medicine from   the manufacture [employer] and pt's  would like to make sure that   [facility] knows.  ---------------------------------------------------------------------------  --------------,  Name of Medication? ergocalciferol (ERGOCALCIFEROL) 1,250 mcg (50,000   unit) capsule  Patient-reported dosage and instructions? once a week, every Saturday  How many days do you have left? 1  Preferred Pharmacy? [address] phone number (if available)? [phone]  ---------------------------------------------------------------------------  --------------  Jaylene Almazan INFO  What is the best way for the office to contact you? OK to leave message on   [facility]  Preferred Call Back Phone Number? [phone]  ---------------------------------------------------------------------------  --------------  SCRIPT ANSWERS  Relationship to Patient? Other  Representative Name? Roger  Is the Representative on the appropriate HIPAA document in Epic?  Yes

## 2022-12-02 DIAGNOSIS — E55.9 VITAMIN D DEFICIENCY: ICD-10-CM

## 2022-12-02 DIAGNOSIS — E03.9 ACQUIRED HYPOTHYROIDISM: ICD-10-CM

## 2022-12-02 RX ORDER — MELATONIN
1000 DAILY
Qty: 90 TABLET | Refills: 3 | Status: SHIPPED | OUTPATIENT
Start: 2022-12-02

## 2022-12-02 RX ORDER — LEVOTHYROXINE SODIUM 75 UG/1
75 TABLET ORAL
Qty: 90 TABLET | Refills: 3 | Status: SHIPPED | OUTPATIENT
Start: 2022-12-02

## 2022-12-02 NOTE — TELEPHONE ENCOUNTER
Patient's  called to state [facility] told him his wife needs to have her levothyroxine filled via the hospital at [facility] as they do not work with [address] of the medication. They state if the hospital pharmacy is not able to fill it then an override can be submitted. Please fill the vitamin d via [facility].

## 2023-03-13 ENCOUNTER — OFFICE VISIT (OUTPATIENT)
Dept: INTERNAL MEDICINE CLINIC | Age: 63
End: 2023-03-13
Payer: COMMERCIAL

## 2023-03-13 VITALS
TEMPERATURE: 98.3 F | DIASTOLIC BLOOD PRESSURE: 89 MMHG | WEIGHT: 156 LBS | BODY MASS INDEX: 28.71 KG/M2 | OXYGEN SATURATION: 98 % | HEART RATE: 67 BPM | HEIGHT: 62 IN | RESPIRATION RATE: 14 BRPM | SYSTOLIC BLOOD PRESSURE: 164 MMHG

## 2023-03-13 DIAGNOSIS — R03.0 ELEVATED BLOOD PRESSURE READING: ICD-10-CM

## 2023-03-13 DIAGNOSIS — R53.83 FATIGUE, UNSPECIFIED TYPE: Primary | ICD-10-CM

## 2023-03-13 DIAGNOSIS — E55.9 VITAMIN D DEFICIENCY: ICD-10-CM

## 2023-03-13 PROCEDURE — 99214 OFFICE O/P EST MOD 30 MIN: CPT | Performed by: INTERNAL MEDICINE

## 2023-03-13 NOTE — ASSESSMENT & PLAN NOTE
Elevated in clinic today but reports her blood pressure runs 110-130s at home including at other doctors offices  Advised patient to bring her cuff to work and see if they can check her cuff against their cuff to see if it correlates. Otherwise, recommend monitoring.

## 2023-03-13 NOTE — ASSESSMENT & PLAN NOTE
9/2022  CBC normal  TSH normal  Vit D 69.6    Feels like since switching to daily vit D, increased fatigue   No CP, SOB, bleeding  No current sleep issues   No snoring, apnea   Sensitive to meds so she thinks it's the medication   9/2022  CBC normal  TSH normal  Vit D 69.6    Rec stopping daily vit d supplement and monitor for up to 4 weeks.  If significant improvement after 2 weeks, consider going back to weekly vit d but consider biweekly dosing  If no improvement, discussed getting labs

## 2023-03-13 NOTE — PROGRESS NOTES
Note   Chief Complaint   Fatigue    Uday Garza is a 57 y.o. female     1. Fatigue, unspecified type  Assessment & Plan:  9/2022  CBC normal  TSH normal  Vit D 69.6    Feels like since switching to daily vit D, increased fatigue   No CP, SOB, bleeding  No current sleep issues   No snoring, apnea   Sensitive to meds so she thinks it's the medication   9/2022  CBC normal  TSH normal  Vit D 69.6    Rec stopping daily vit d supplement and monitor for up to 4 weeks. If significant improvement after 2 weeks, consider going back to weekly vit d but consider biweekly dosing  If no improvement, discussed getting labs  2. Vitamin D deficiency  Assessment & Plan:  9/2022  CBC normal  TSH normal  Vit D 69.6    Feels like since switching to daily vit D, increased fatigue   No CP, SOB, bleeding  No current sleep issues   No snoring, apnea   Sensitive to meds so she thinks it's the medication   9/2022  CBC normal  TSH normal  Vit D 69.6    Rec stopping daily vit d supplement and monitor for up to 4 weeks. If significant improvement after 2 weeks, consider going back to weekly vit d but consider biweekly dosing  3. Elevated blood pressure reading  Assessment & Plan:  Elevated in clinic today but reports her blood pressure runs 110-130s at home including at other doctors offices  Advised patient to bring her cuff to work and see if they can check her cuff against their cuff to see if it correlates. Otherwise, recommend monitoring. Benefits, risks, possible drug interactions, and side effects of all new medications were reviewed with the patient. Pt verbalized understanding. Return to clinic: As needed    An electronic signature was used to authenticate this note. Fabienne Muniz MD  [facility]  3/13/2023    No future appointments.      Objective   Vitals:       Visit Vitals  BP (!) 164/89 (BP 1 Location: Left upper arm, BP Patient Position: Sitting, BP Cuff Size: Small adult) Pulse 67   Temp 98.3 °F (36.8 °C) (Oral)   Resp 14   Ht 5' 2\" (1.575 m)   Wt 156 lb (70.8 kg)   SpO2 98%   BMI 28.53 kg/m²        Physical Exam  Constitutional:       Appearance: Normal appearance. She is not ill-appearing. Cardiovascular:      Rate and Rhythm: Normal rate and regular rhythm. Heart sounds: No murmur heard. No friction rub. No gallop. Pulmonary:      Effort: No respiratory distress. Breath sounds: Normal breath sounds. No wheezing, rhonchi or rales. Neurological:      Mental Status: She is alert. Current Outpatient Medications   Medication Sig    levothyroxine (SYNTHROID) 75 mcg tablet Take 1 Tablet by mouth Daily (before breakfast). Indications: low thyroid     No current facility-administered medications for this visit.

## 2023-03-13 NOTE — ASSESSMENT & PLAN NOTE
9/2022  CBC normal  TSH normal  Vit D 69.6    Feels like since switching to daily vit D, increased fatigue   No CP, SOB, bleeding  No current sleep issues   No snoring, apnea   Sensitive to meds so she thinks it's the medication   9/2022  CBC normal  TSH normal  Vit D 69.6    Rec stopping daily vit d supplement and monitor for up to 4 weeks.  If significant improvement after 2 weeks, consider going back to weekly vit d but consider biweekly dosing

## 2023-03-23 ENCOUNTER — TELEPHONE (OUTPATIENT)
Dept: INTERNAL MEDICINE CLINIC | Age: 63
End: 2023-03-23

## 2023-03-23 ENCOUNTER — VIRTUAL VISIT (OUTPATIENT)
Dept: INTERNAL MEDICINE CLINIC | Age: 63
End: 2023-03-23
Payer: COMMERCIAL

## 2023-03-23 DIAGNOSIS — U07.1 COVID-19: Primary | ICD-10-CM

## 2023-03-23 PROCEDURE — 99213 OFFICE O/P EST LOW 20 MIN: CPT | Performed by: INTERNAL MEDICINE

## 2023-03-23 NOTE — PROGRESS NOTES
Consent: Deya Slaughter, who was seen by synchronous (real-time) audio-video technology, and/or her healthcare decision maker, is aware that this patient-initiated, Telehealth encounter on 3/23/2023 is a billable service, with coverage as determined by her insurance carrier. She is aware that she may receive a bill and has provided verbal consent to proceed: Yes. I was in the office while conducting this encounter. Patient identification was verified at the start of the visit: YES  This visit was done with doxy. me  The patient was located at home in a state where the provider was licensed to provide care. Patient was located at HOME      Note   Chief Complaint   yael Slaughter is a 57 y.o. female     1. COVID-19  -     nirmatrelvir-ritonavir (Paxlovid, EUA,) 300 mg (150 mg x 2)-100 mg; Take by mouth Nirmatrelvir 300 mg with ritonavir 100 mg, administered together, twice daily for 5 day, Normal, Disp-1 Box, R-0     Tuesday - developed cough, body aches, fatigue, fever  Covid test positive yesterday   was sick last week, they had come back from travel  Paxlovid sent to pharmacy. Symptomatic treatment. Call if not improving as expected    We discussed the expected course, resolution and complications of the diagnosis(es) in detail. Medication risks, benefits, costs, interactions, and alternatives were discussed as indicated. I advised her to contact the office if her condition worsens, changes or fails to improve as anticipated. She expressed understanding with the diagnosis(es) and plan. Return to clinic:  as needed    Darleen Guidry MD  [facility]  3/23/2023    No future appointments. Objective   Vitals:       No flowsheet data found. Physical Exam  Constitutional:       Appearance: Normal appearance. She is not ill-appearing. Pulmonary:      Effort: No respiratory distress. Neurological:      Mental Status: She is alert. Current Outpatient Medications   Medication Sig    nirmatrelvir-ritonavir (Paxlovid, EUA,) 300 mg (150 mg x 2)-100 mg Take by mouth Nirmatrelvir 300 mg with ritonavir 100 mg, administered together, twice daily for 5 day    levothyroxine (SYNTHROID) 75 mcg tablet Take 1 Tablet by mouth Daily (before breakfast). Indications: low thyroid     No current facility-administered medications for this visit. Felipe Engel is a 57 y.o. female being evaluated by a video visit encounter for concerns as above. A caregiver was present when appropriate. Due to this being a TeleHealth encounter (During QQRUU-04 public health emergency), evaluation of the following organ systems was limited: Vitals/Constitutional/EENT/Resp/CV/GI//MS/Neuro/Skin/Heme-Lymph-Imm. Pursuant to the emergency declaration under the [facility], [facility] waiver authority and the [employer] Act, this Virtual  Visit was conducted, with patient's (and/or legal guardian's) consent, to reduce the patient's risk of exposure to COVID-19 and provide necessary medical care. Services were provided through a video synchronous discussion virtually to substitute for in-person clinic visit.

## 2023-03-23 NOTE — TELEPHONE ENCOUNTER
Patient called in to state she has been feeling poorly and has tested positive for Covid. Please call patient back and advise as to her next steps.

## 2023-04-11 ENCOUNTER — TELEPHONE (OUTPATIENT)
Dept: INTERNAL MEDICINE CLINIC | Age: 63
End: 2023-04-11

## 2023-06-05 RX ORDER — LEVOTHYROXINE SODIUM 0.07 MG/1
TABLET ORAL
Qty: 90 TABLET | Refills: 3 | Status: SHIPPED | OUTPATIENT
Start: 2023-06-05

## 2024-01-12 ENCOUNTER — TELEPHONE (OUTPATIENT)
Age: 64
End: 2024-01-12

## 2024-01-12 NOTE — TELEPHONE ENCOUNTER
----- Message from Kaylie Fontana sent at 1/12/2024  8:19 AM EST -----  Subject: Appointment Request    Reason for Call: New Patient/New to Provider Appointment needed: New   Patient Request Appointment    QUESTIONS    Reason for appointment request? No appointments available during search     Additional Information for Provider? Pt  Regis is calling in to   schedule a new to provider appointment. Pt is previous pt of Yvonne Moran. States he was advised to call in after the new year to   schedule with the new provider. Please advise.   ---------------------------------------------------------------------------  --------------  CALL BACK INFO  [phone]; OK to leave message on voicemail  ---------------------------------------------------------------------------  --------------  SCRIPT ANSWERS

## 2024-01-12 NOTE — TELEPHONE ENCOUNTER
PSR reached out to patient to schedule new patient appointment - we have set this up for 02/01/2024 at 10 AM

## 2024-02-01 ENCOUNTER — OFFICE VISIT (OUTPATIENT)
Age: 64
End: 2024-02-01
Payer: MEDICARE

## 2024-02-01 VITALS
BODY MASS INDEX: 28.97 KG/M2 | DIASTOLIC BLOOD PRESSURE: 86 MMHG | SYSTOLIC BLOOD PRESSURE: 153 MMHG | RESPIRATION RATE: 14 BRPM | WEIGHT: 157.4 LBS | OXYGEN SATURATION: 97 % | HEIGHT: 62 IN | TEMPERATURE: 98 F | HEART RATE: 66 BPM

## 2024-02-01 DIAGNOSIS — Z12.31 ENCOUNTER FOR SCREENING MAMMOGRAM FOR BREAST CANCER: ICD-10-CM

## 2024-02-01 DIAGNOSIS — R03.0 ELEVATED BLOOD PRESSURE READING: ICD-10-CM

## 2024-02-01 DIAGNOSIS — E03.9 ACQUIRED HYPOTHYROIDISM: Primary | ICD-10-CM

## 2024-02-01 DIAGNOSIS — E03.9 ACQUIRED HYPOTHYROIDISM: ICD-10-CM

## 2024-02-01 PROBLEM — J06.9 URI (UPPER RESPIRATORY INFECTION): Status: RESOLVED | Noted: 2021-12-10 | Resolved: 2024-02-01

## 2024-02-01 PROBLEM — Z00.00 ROUTINE ADULT HEALTH MAINTENANCE: Status: RESOLVED | Noted: 2021-12-10 | Resolved: 2024-02-01

## 2024-02-01 PROBLEM — R53.83 FATIGUE, UNSPECIFIED TYPE: Status: RESOLVED | Noted: 2023-03-13 | Resolved: 2024-02-01

## 2024-02-01 LAB
ALBUMIN SERPL-MCNC: 4.3 G/DL (ref 3.5–5)
ALBUMIN/GLOB SERPL: 1.3 (ref 1.1–2.2)
ALP SERPL-CCNC: 58 U/L (ref 45–117)
ALT SERPL-CCNC: 27 U/L (ref 12–78)
ANION GAP SERPL CALC-SCNC: 7 MMOL/L (ref 5–15)
AST SERPL-CCNC: 14 U/L (ref 15–37)
BASOPHILS # BLD: 0.1 K/UL (ref 0–0.1)
BASOPHILS NFR BLD: 1 % (ref 0–1)
BILIRUB SERPL-MCNC: 0.4 MG/DL (ref 0.2–1)
BUN SERPL-MCNC: 19 MG/DL (ref 6–20)
BUN/CREAT SERPL: 35 (ref 12–20)
CALCIUM SERPL-MCNC: 9.2 MG/DL (ref 8.5–10.1)
CHLORIDE SERPL-SCNC: 107 MMOL/L (ref 97–108)
CHOLEST SERPL-MCNC: 203 MG/DL
CO2 SERPL-SCNC: 26 MMOL/L (ref 21–32)
CREAT SERPL-MCNC: 0.55 MG/DL (ref 0.55–1.02)
DIFFERENTIAL METHOD BLD: NORMAL
EOSINOPHIL # BLD: 0.1 K/UL (ref 0–0.4)
EOSINOPHIL NFR BLD: 2 % (ref 0–7)
ERYTHROCYTE [DISTWIDTH] IN BLOOD BY AUTOMATED COUNT: 12.5 % (ref 11.5–14.5)
GLOBULIN SER CALC-MCNC: 3.2 G/DL (ref 2–4)
GLUCOSE SERPL-MCNC: 99 MG/DL (ref 65–100)
HCT VFR BLD AUTO: 42.7 % (ref 35–47)
HDLC SERPL-MCNC: 53 MG/DL
HDLC SERPL: 3.8 (ref 0–5)
HGB BLD-MCNC: 14.2 G/DL (ref 11.5–16)
IMM GRANULOCYTES # BLD AUTO: 0 K/UL (ref 0–0.04)
IMM GRANULOCYTES NFR BLD AUTO: 0 % (ref 0–0.5)
LDLC SERPL CALC-MCNC: 124.8 MG/DL (ref 0–100)
LYMPHOCYTES # BLD: 2.3 K/UL (ref 0.8–3.5)
LYMPHOCYTES NFR BLD: 34 % (ref 12–49)
MCH RBC QN AUTO: 29.2 PG (ref 26–34)
MCHC RBC AUTO-ENTMCNC: 33.3 G/DL (ref 30–36.5)
MCV RBC AUTO: 87.7 FL (ref 80–99)
MONOCYTES # BLD: 0.4 K/UL (ref 0–1)
MONOCYTES NFR BLD: 6 % (ref 5–13)
NEUTS SEG # BLD: 3.9 K/UL (ref 1.8–8)
NEUTS SEG NFR BLD: 57 % (ref 32–75)
NRBC # BLD: 0 K/UL (ref 0–0.01)
NRBC BLD-RTO: 0 PER 100 WBC
PLATELET # BLD AUTO: 178 K/UL (ref 150–400)
PMV BLD AUTO: 12.5 FL (ref 8.9–12.9)
POTASSIUM SERPL-SCNC: 4.4 MMOL/L (ref 3.5–5.1)
PROT SERPL-MCNC: 7.5 G/DL (ref 6.4–8.2)
RBC # BLD AUTO: 4.87 M/UL (ref 3.8–5.2)
SODIUM SERPL-SCNC: 140 MMOL/L (ref 136–145)
TRIGL SERPL-MCNC: 126 MG/DL
TSH SERPL DL<=0.05 MIU/L-ACNC: 0.73 UIU/ML (ref 0.36–3.74)
VLDLC SERPL CALC-MCNC: 25.2 MG/DL
WBC # BLD AUTO: 6.8 K/UL (ref 3.6–11)

## 2024-02-01 PROCEDURE — G8400 PT W/DXA NO RESULTS DOC: HCPCS | Performed by: INTERNAL MEDICINE

## 2024-02-01 PROCEDURE — 1123F ACP DISCUSS/DSCN MKR DOCD: CPT | Performed by: INTERNAL MEDICINE

## 2024-02-01 PROCEDURE — G8427 DOCREV CUR MEDS BY ELIG CLIN: HCPCS | Performed by: INTERNAL MEDICINE

## 2024-02-01 PROCEDURE — G8419 CALC BMI OUT NRM PARAM NOF/U: HCPCS | Performed by: INTERNAL MEDICINE

## 2024-02-01 PROCEDURE — G8484 FLU IMMUNIZE NO ADMIN: HCPCS | Performed by: INTERNAL MEDICINE

## 2024-02-01 PROCEDURE — 99214 OFFICE O/P EST MOD 30 MIN: CPT | Performed by: INTERNAL MEDICINE

## 2024-02-01 PROCEDURE — 1090F PRES/ABSN URINE INCON ASSESS: CPT | Performed by: INTERNAL MEDICINE

## 2024-02-01 PROCEDURE — 3017F COLORECTAL CA SCREEN DOC REV: CPT | Performed by: INTERNAL MEDICINE

## 2024-02-01 PROCEDURE — 1036F TOBACCO NON-USER: CPT | Performed by: INTERNAL MEDICINE

## 2024-02-01 SDOH — ECONOMIC STABILITY: FOOD INSECURITY: WITHIN THE PAST 12 MONTHS, YOU WORRIED THAT YOUR FOOD WOULD RUN OUT BEFORE YOU GOT MONEY TO BUY MORE.: NEVER TRUE

## 2024-02-01 SDOH — ECONOMIC STABILITY: INCOME INSECURITY: HOW HARD IS IT FOR YOU TO PAY FOR THE VERY BASICS LIKE FOOD, HOUSING, MEDICAL CARE, AND HEATING?: NOT HARD AT ALL

## 2024-02-01 SDOH — ECONOMIC STABILITY: FOOD INSECURITY: WITHIN THE PAST 12 MONTHS, THE FOOD YOU BOUGHT JUST DIDN'T LAST AND YOU DIDN'T HAVE MONEY TO GET MORE.: NEVER TRUE

## 2024-02-01 SDOH — ECONOMIC STABILITY: HOUSING INSECURITY
IN THE LAST 12 MONTHS, WAS THERE A TIME WHEN YOU DID NOT HAVE A STEADY PLACE TO SLEEP OR SLEPT IN A SHELTER (INCLUDING NOW)?: NO

## 2024-02-01 ASSESSMENT — PATIENT HEALTH QUESTIONNAIRE - PHQ9
SUM OF ALL RESPONSES TO PHQ QUESTIONS 1-9: 0
1. LITTLE INTEREST OR PLEASURE IN DOING THINGS: 0
2. FEELING DOWN, DEPRESSED OR HOPELESS: 0
SUM OF ALL RESPONSES TO PHQ9 QUESTIONS 1 & 2: 0
SUM OF ALL RESPONSES TO PHQ QUESTIONS 1-9: 0

## 2024-02-01 NOTE — PATIENT INSTRUCTIONS
The ideal exercise is 150 minutes per week (30 minutes for 5 days or 22 minutes every day) of moderate intensity aerobic exercise. Moderate intensity is walking briskly, riding a bike, pushing a , playing doubles tennis. Two days per week of strengthening for major muscles is also recommended.     When checking your blood pressure at home: you should sit for five minutes with your feet on the floor and your back supported and an empty bladder, prior to checking your blood pressure.   Check your blood pressure at different times of the day and write down the readings.   Send the readings to me after a couple weeks.  The goal is to be under 130/80 at home.    Bring your machine for BP to your next appointment.

## 2024-02-01 NOTE — PROGRESS NOTES
Kathryn Tam (:  1960) is a 63 y.o. female,New patient, here for evaluation of the following chief complaint(s):  New Patient  Saw Dr. Moran here.       ASSESSMENT/PLAN:    1. Acquired hypothyroidism  Clinically euthyroid. Will continue current level of levothyroxine, pending labs.   - Comprehensive Metabolic Panel; Future  - CBC with Auto Differential; Future  - TSH; Future  - Lipid Panel; Future    2. Elevated blood pressure reading  Again noted to have elevated blood pressure in the office, but reported good blood pressure readings at home.  She does have strong family history of hypertension.  I will have a low threshold for starting her on medication if she has elevated blood pressures at home.  We discussed low-sodium, DASH diet, exercise and home blood pressure recording.  She will bring or send her blood pressure readings in to the office in 2 weeks.  - Comprehensive Metabolic Panel; Future  - CBC with Auto Differential; Future  - TSH; Future    3. Encounter for screening mammogram for breast cancer  Mammogram is due for screening. Order written, so she can schedule the mammogram.   - [facility] ENRIKE DIGITAL SCREEN BILATERAL; Future           Follow up: 3 months for Welcome to Medicare       Subjective   SUBJECTIVE/OBJECTIVE:    HPI    Hypothyroidism: Ms. Tam is generally feeling well on thyroid replacement. Denies change in bowel habits, excessive fatigue, hot/cold intolerance.    Elevated BP: Her diet has been fairly low in sodium--she does not add. She denies chest pain, dyspnea, leg swelling. Exercises very little. Her blood pressure had been good at home, but she has not checked lately.     She is not fasting today.       Current Outpatient Medications   Medication Instructions    levothyroxine (SYNTHROID) 75 MCG tablet TAKE ONE TABLET BY MOUTH ONE TIME A DAY BEFORE BREAKFAST        Allergies   Allergen Reactions    Iodinated Contrast Media Rash          Objective     BP (!) 153/86 (Site:

## 2024-02-05 RX ORDER — LEVOTHYROXINE SODIUM 0.07 MG/1
TABLET ORAL
Qty: 90 TABLET | Refills: 4 | Status: SHIPPED | OUTPATIENT
Start: 2024-02-05

## 2024-02-10 ENCOUNTER — OFFICE VISIT (OUTPATIENT)
Age: 64
End: 2024-02-10

## 2024-02-10 VITALS
DIASTOLIC BLOOD PRESSURE: 86 MMHG | SYSTOLIC BLOOD PRESSURE: 141 MMHG | HEART RATE: 59 BPM | WEIGHT: 158 LBS | RESPIRATION RATE: 18 BRPM | TEMPERATURE: 98.5 F | BODY MASS INDEX: 29.08 KG/M2 | OXYGEN SATURATION: 98 % | HEIGHT: 62 IN

## 2024-02-10 DIAGNOSIS — R05.1 ACUTE COUGH: Primary | ICD-10-CM

## 2024-02-10 DIAGNOSIS — R03.0 ELEVATED BLOOD PRESSURE READING: ICD-10-CM

## 2024-02-10 RX ORDER — GUAIFENESIN/DEXTROMETHORPHAN 100-10MG/5
5 SYRUP ORAL 3 TIMES DAILY PRN
Qty: 120 ML | Refills: 0 | Status: SHIPPED | OUTPATIENT
Start: 2024-02-10 | End: 2024-02-20

## 2024-02-10 RX ORDER — BENZONATATE 200 MG/1
200 CAPSULE ORAL 3 TIMES DAILY PRN
Qty: 21 CAPSULE | Refills: 0 | Status: SHIPPED | OUTPATIENT
Start: 2024-02-10 | End: 2024-02-17

## 2024-02-10 RX ORDER — CETIRIZINE HYDROCHLORIDE 10 MG/1
10 TABLET ORAL DAILY
Qty: 30 TABLET | Refills: 0 | Status: SHIPPED | OUTPATIENT
Start: 2024-02-10 | End: 2024-03-11

## 2024-02-10 ASSESSMENT — ENCOUNTER SYMPTOMS
SHORTNESS OF BREATH: 0
SINUS PRESSURE: 0
SINUS PAIN: 0
SORE THROAT: 0
COUGH: 1

## 2024-02-10 NOTE — PROGRESS NOTES
Exam  Constitutional:       General: She is not in acute distress.     Appearance: Normal appearance. She is not ill-appearing.   HENT:      Head: Normocephalic and atraumatic.      Right Ear: Tympanic membrane, ear canal and external ear normal.      Left Ear: Tympanic membrane, ear canal and external ear normal.      Nose: Nose normal.      Mouth/Throat:      Mouth: Mucous membranes are moist.      Pharynx: No posterior oropharyngeal erythema.   Cardiovascular:      Rate and Rhythm: Normal rate and regular rhythm.      Pulses: Normal pulses.      Heart sounds: Normal heart sounds.   Pulmonary:      Effort: Pulmonary effort is normal.      Breath sounds: Normal breath sounds.   Lymphadenopathy:      Cervical: No cervical adenopathy.   Skin:     General: Skin is warm and dry.   Neurological:      Mental Status: She is alert and oriented to person, place, and time.         Assessment & Plan     Diagnoses and all orders for this visit:  Acute cough  -     benzonatate (TESSALON) 200 MG capsule; Take 1 capsule by mouth 3 times daily as needed for Cough  Elevated blood pressure reading  -     Ambulatory referral to Internal Medicine      No orders to display   Vital signs stable  Patient alert and oriented x3  In no apparent distress    Based on patient's symptoms, cough is likely of viral nature.  I considered other causes of cough to include bronchitis, pulmonary edema, CHF, asthma, sinusitis, pneumonia, allergic rhinitis, medication induced cough from ACE inhibitors and GERD.  Patient is afebrile with clear lung auscultation in all fields.  Patient denies any shortness of breath.      Treatment plan to include reduction of exposure to irritants and increased humidification in the home.  Home remedies to include honey, throat lozenges and hot tea.  Recommended antihistamines, decongestants, nasal saline spray and antitussive agents along with Vicks Vaporub on feet at night time.  Educational information provided to

## 2024-02-10 NOTE — PATIENT INSTRUCTIONS
Thank you for visiting [facility] today.    For relief at home, you may use the following to help with your cough:  Throat lozenges, hot tea or honey  Minimize contact with irritants such as cigarette smoke  Zyrtec, Claritin, Allegra or Xyzal may provide relief  Ibuprofen/Tylenol for pain or muscle aches.  Do not take ibuprofen if you have been prescribed prednisone.  Vicks VapoRub on the soles of feet with socks at night time  Saline nasal spray 8-10 times daily  Increase humidification in your home, preferably cool mist  Cough medications as prescribed:  Mucinex DM or Robitussin DM/Tessalon at night    DASH Diet: After Your Visit  Your Care Instructions  The DASH diet is an eating plan that can help lower your blood pressure. DASH stands for Dietary Approaches to Stop Hypertension. Hypertension is high blood pressure.  The DASH diet focuses on eating foods that are high in calcium, potassium, and magnesium. These nutrients can lower blood pressure. The foods that are highest in these nutrients are fruits, vegetables, low-fat dairy products, nuts, seeds, and legumes. But taking calcium, potassium, and magnesium supplements instead of eating foods that are high in those nutrients does not have the same effect. The DASH diet also includes whole grains, fish, and poultry.  The DASH diet is one of several lifestyle changes your doctor may recommend to lower your high blood pressure. Your doctor may also want you to decrease the amount of sodium in your diet. Lowering sodium while following the DASH diet can lower blood pressure even further than just the DASH diet alone.  Follow-up care is a key part of your treatment and safety. Be sure to make and go to all appointments, and call your doctor if you are having problems. It’s also a good idea to know your test results and keep a list of the medicines you take.  How can you care for yourself at home?  Following the DASH diet  Eat 4 to 5 servings of fruit

## 2024-03-12 ENCOUNTER — HOSPITAL ENCOUNTER (OUTPATIENT)
Facility: HOSPITAL | Age: 64
Discharge: HOME | End: 2024-03-15
Payer: MEDICARE

## 2024-03-12 VITALS — HEIGHT: 62 IN | BODY MASS INDEX: 29.08 KG/M2 | WEIGHT: 158 LBS

## 2024-03-12 DIAGNOSIS — Z12.31 ENCOUNTER FOR SCREENING MAMMOGRAM FOR BREAST CANCER: ICD-10-CM

## 2024-03-12 PROCEDURE — 77063 BREAST TOMOSYNTHESIS BI: CPT

## 2024-03-19 ENCOUNTER — HOSPITAL ENCOUNTER (OUTPATIENT)
Facility: HOSPITAL | Age: 64
Discharge: HOME | End: 2024-03-22
Attending: INTERNAL MEDICINE
Payer: MEDICARE

## 2024-03-19 ENCOUNTER — OFFICE VISIT (OUTPATIENT)
Age: 64
End: 2024-03-19
Payer: MEDICARE

## 2024-03-19 VITALS
RESPIRATION RATE: 14 BRPM | TEMPERATURE: 97.6 F | HEART RATE: 67 BPM | BODY MASS INDEX: 29.19 KG/M2 | WEIGHT: 158.6 LBS | DIASTOLIC BLOOD PRESSURE: 71 MMHG | HEIGHT: 62 IN | SYSTOLIC BLOOD PRESSURE: 112 MMHG | OXYGEN SATURATION: 97 %

## 2024-03-19 DIAGNOSIS — Z87.442 HISTORY OF NEPHROLITHIASIS: ICD-10-CM

## 2024-03-19 DIAGNOSIS — R92.8 ABNORMAL MAMMOGRAM OF LEFT BREAST: ICD-10-CM

## 2024-03-19 DIAGNOSIS — M54.50 ACUTE BILATERAL LOW BACK PAIN WITHOUT SCIATICA: Primary | ICD-10-CM

## 2024-03-19 LAB
BILIRUBIN, URINE, POC: NEGATIVE
BLOOD URINE, POC: ABNORMAL
GLUCOSE URINE, POC: NEGATIVE
KETONES, URINE, POC: NEGATIVE
LEUKOCYTE ESTERASE, URINE, POC: NEGATIVE
NITRITE, URINE, POC: NEGATIVE
PH, URINE, POC: 8.5 (ref 4.6–8)
PROTEIN,URINE, POC: NEGATIVE
SPECIFIC GRAVITY, URINE, POC: 1.01 (ref 1–1.03)
URINALYSIS CLARITY, POC: CLEAR
URINALYSIS COLOR, POC: YELLOW
UROBILINOGEN, POC: ABNORMAL

## 2024-03-19 PROCEDURE — G8427 DOCREV CUR MEDS BY ELIG CLIN: HCPCS | Performed by: INTERNAL MEDICINE

## 2024-03-19 PROCEDURE — 1123F ACP DISCUSS/DSCN MKR DOCD: CPT | Performed by: INTERNAL MEDICINE

## 2024-03-19 PROCEDURE — 99213 OFFICE O/P EST LOW 20 MIN: CPT | Performed by: INTERNAL MEDICINE

## 2024-03-19 PROCEDURE — G8419 CALC BMI OUT NRM PARAM NOF/U: HCPCS | Performed by: INTERNAL MEDICINE

## 2024-03-19 PROCEDURE — 3017F COLORECTAL CA SCREEN DOC REV: CPT | Performed by: INTERNAL MEDICINE

## 2024-03-19 PROCEDURE — G0279 TOMOSYNTHESIS, MAMMO: HCPCS

## 2024-03-19 PROCEDURE — 1090F PRES/ABSN URINE INCON ASSESS: CPT | Performed by: INTERNAL MEDICINE

## 2024-03-19 PROCEDURE — PBSHW AMB POC URINALYSIS DIP STICK AUTO W/O MICRO: Performed by: INTERNAL MEDICINE

## 2024-03-19 PROCEDURE — 81003 URINALYSIS AUTO W/O SCOPE: CPT | Performed by: INTERNAL MEDICINE

## 2024-03-19 PROCEDURE — G8400 PT W/DXA NO RESULTS DOC: HCPCS | Performed by: INTERNAL MEDICINE

## 2024-03-19 PROCEDURE — 76642 ULTRASOUND BREAST LIMITED: CPT

## 2024-03-19 PROCEDURE — G8484 FLU IMMUNIZE NO ADMIN: HCPCS | Performed by: INTERNAL MEDICINE

## 2024-03-19 PROCEDURE — 1036F TOBACCO NON-USER: CPT | Performed by: INTERNAL MEDICINE

## 2024-03-19 RX ORDER — TIZANIDINE 4 MG/1
4 TABLET ORAL
Qty: 7 TABLET | Refills: 0 | Status: SHIPPED | OUTPATIENT
Start: 2024-03-19 | End: 2024-03-26

## 2024-03-19 ASSESSMENT — PATIENT HEALTH QUESTIONNAIRE - PHQ9
SUM OF ALL RESPONSES TO PHQ QUESTIONS 1-9: 2
SUM OF ALL RESPONSES TO PHQ QUESTIONS 1-9: 2
SUM OF ALL RESPONSES TO PHQ9 QUESTIONS 1 & 2: 2
SUM OF ALL RESPONSES TO PHQ QUESTIONS 1-9: 2
SUM OF ALL RESPONSES TO PHQ QUESTIONS 1-9: 2
2. FEELING DOWN, DEPRESSED OR HOPELESS: SEVERAL DAYS
1. LITTLE INTEREST OR PLEASURE IN DOING THINGS: SEVERAL DAYS

## 2024-03-19 NOTE — PROGRESS NOTES
A/P:       1. Acute bilateral low back pain without sciatica  Acute, likely muscular pain, strain.  No deficits on exam.  She will try taking a muscle relaxant at bedtime and follow-up if symptoms are not improved with this and home exercises.  - tiZANidine (ZANAFLEX) 4 MG tablet; Take 1 tablet by mouth nightly for 7 days  Dispense: 7 tablet; Refill: 0  - AMB POC URINALYSIS DIP STICK AUTO W/O MICRO    2. History of nephrolithiasis  No significant hematuria on exam or CVA tenderness.  Will monitor for improvement with muscle relaxant.  - AMB POC URINALYSIS DIP STICK AUTO W/O MICRO       Follow up:  prn         An electronic signature was used to authenticate this note.    --Elizabet Marlow MD         HPI: Kathryn Tam is here for an acute visit.      Back pain: Ms. Tam has had back pain for a few weeks. The pain is located in the right lower back, radiating around. It is worse with lying on the the other side or her back, when she first stands. It has been keeping her up. There was no preceding trauma, denies heavy lifting.  There is no associated weakness, numbness, tingling, incontinence, hematuria, fever, nausea.   She has been using anything yet.         Current Outpatient Medications:     levothyroxine (SYNTHROID) 75 MCG tablet, TAKE ONE TABLET BY MOUTH ONE TIME A DAY BEFORE BREAKFAST, Disp: 90 tablet, Rfl: 4       Allergies   Allergen Reactions    Iodinated Contrast Media Rash          BP (!) 165/91 (Site: Left Upper Arm, Position: Sitting, Cuff Size: Small Adult)   Pulse 67   Temp 97.6 °F (36.4 °C) (Oral)   Resp 14   Ht 1.575 m (5' 2\")   Wt 71.9 kg (158 lb 9.6 oz)   LMP  (LMP Unknown)   SpO2 97%   BMI 29.01 kg/m²      Home blood pressure: 2/18/24 was 112/71    General: well nourished, well appearing, in no distress  CV: distal pulses are 2+ in lower extremities, no pitting peripheral edema noted  MS: no focal bony or paraspinal tenderness in lumbar spine, ROM is full, but has pain with rotation to

## 2024-05-01 SDOH — HEALTH STABILITY: PHYSICAL HEALTH: ON AVERAGE, HOW MANY DAYS PER WEEK DO YOU ENGAGE IN MODERATE TO STRENUOUS EXERCISE (LIKE A BRISK WALK)?: 2 DAYS

## 2024-05-01 SDOH — HEALTH STABILITY: PHYSICAL HEALTH: ON AVERAGE, HOW MANY MINUTES DO YOU ENGAGE IN EXERCISE AT THIS LEVEL?: 30 MIN

## 2024-05-01 ASSESSMENT — LIFESTYLE VARIABLES
HOW MANY STANDARD DRINKS CONTAINING ALCOHOL DO YOU HAVE ON A TYPICAL DAY: 1 OR 2
HOW OFTEN DO YOU HAVE SIX OR MORE DRINKS ON ONE OCCASION: 1
HOW MANY STANDARD DRINKS CONTAINING ALCOHOL DO YOU HAVE ON A TYPICAL DAY: 1
HOW OFTEN DO YOU HAVE A DRINK CONTAINING ALCOHOL: 3
HOW OFTEN DO YOU HAVE A DRINK CONTAINING ALCOHOL: 2-4 TIMES A MONTH

## 2024-05-01 ASSESSMENT — PATIENT HEALTH QUESTIONNAIRE - PHQ9
SUM OF ALL RESPONSES TO PHQ QUESTIONS 1-9: 0
2. FEELING DOWN, DEPRESSED OR HOPELESS: NOT AT ALL
SUM OF ALL RESPONSES TO PHQ QUESTIONS 1-9: 0
SUM OF ALL RESPONSES TO PHQ9 QUESTIONS 1 & 2: 0
SUM OF ALL RESPONSES TO PHQ QUESTIONS 1-9: 0
SUM OF ALL RESPONSES TO PHQ QUESTIONS 1-9: 0
1. LITTLE INTEREST OR PLEASURE IN DOING THINGS: NOT AT ALL

## 2024-05-02 ENCOUNTER — OFFICE VISIT (OUTPATIENT)
Age: 64
End: 2024-05-02
Payer: MEDICARE

## 2024-05-02 VITALS
OXYGEN SATURATION: 97 % | BODY MASS INDEX: 29.15 KG/M2 | SYSTOLIC BLOOD PRESSURE: 142 MMHG | DIASTOLIC BLOOD PRESSURE: 94 MMHG | WEIGHT: 158.4 LBS | RESPIRATION RATE: 14 BRPM | HEART RATE: 56 BPM | HEIGHT: 62 IN

## 2024-05-02 DIAGNOSIS — R03.0 ELEVATED BLOOD PRESSURE READING: ICD-10-CM

## 2024-05-02 DIAGNOSIS — M85.89 OTHER SPECIFIED DISORDERS OF BONE DENSITY AND STRUCTURE, MULTIPLE SITES: ICD-10-CM

## 2024-05-02 DIAGNOSIS — Z12.11 COLON CANCER SCREENING: ICD-10-CM

## 2024-05-02 DIAGNOSIS — M54.50 ACUTE BILATERAL LOW BACK PAIN WITHOUT SCIATICA: ICD-10-CM

## 2024-05-02 DIAGNOSIS — Z00.00 WELCOME TO MEDICARE PREVENTIVE VISIT: Primary | ICD-10-CM

## 2024-05-02 PROCEDURE — 90677 PCV20 VACCINE IM: CPT | Performed by: INTERNAL MEDICINE

## 2024-05-02 PROCEDURE — 99213 OFFICE O/P EST LOW 20 MIN: CPT | Performed by: INTERNAL MEDICINE

## 2024-05-02 NOTE — PROGRESS NOTES
Medicare Annual Wellness Visit    Kathryn Tam is here for Medicare AW    Assessment & Plan       1. Welcome to Medicare preventive visit  We discussed the benefits of advanced care planning today. She will bring in a copy of an advanced medical directive. We reviewed the importance and elements of a healthy diet.  Most immunizations are up to date; Shingrix recommended and Prevnar 20 administered today. Colonoscopy is due again in. Continue to follow up here every six months, at least, and with specialists as directed.     2. Other specified disorders of bone density and structure, multiple sites  She should have a bone density test to screen for osteoporosis and fracture risk. Will make specific recommendations after the test.   - DEXA BONE DENSITY AXIAL SKELETON; Future    3. Colon cancer screening  She is scheduled for her colonoscopy.    4. Acute bilateral low back pain without sciatica  Improved.  She continues to have some low back pain, and is in the process of moving so has been lifting boxes.  When she gets settled, she may benefit from physical therapy.    5.  Elevated blood pressure reading  Previously with good blood pressures at home, likely whitecoat elevations.  She has not recently been monitoring blood pressure.  Low-sodium diet, increase exercise recommended.  She will monitor her blood pressures at home and bring her blood pressure cuff to her next appointment.       Recommendations for Preventive Services Due: see orders and patient instructions/AVS.  Recommended screening schedule for the next 5-10 years is provided to the patient in written form: see Patient Instructions/AVS.       Follow up: 1 year for AWV          Subjective         Kathryn Tam is here for a wellness visit.    Health maintenance: She has been seen by the following providers in the last year: urgent care. She is exercising twice a week.  Last colonoscopy was done  in 2019. Her colonoscopy is tomorrow. Her pap smear was a

## 2024-09-19 ENCOUNTER — HOSPITAL ENCOUNTER (OUTPATIENT)
Facility: HOSPITAL | Age: 64
Discharge: HOME | End: 2024-09-22
Attending: INTERNAL MEDICINE
Payer: MEDICARE

## 2024-09-19 DIAGNOSIS — R92.8 ABNORMAL MAMMOGRAM OF LEFT BREAST: ICD-10-CM

## 2024-09-19 PROCEDURE — 76642 ULTRASOUND BREAST LIMITED: CPT

## 2025-03-03 RX ORDER — LEVOTHYROXINE SODIUM 75 UG/1
TABLET ORAL
Qty: 90 TABLET | Refills: 3 | Status: SHIPPED | OUTPATIENT
Start: 2025-03-03

## 2025-03-03 NOTE — TELEPHONE ENCOUNTER
PCP: Elizabet Marlow MD    Last Appointment: Visit date not found    Future Appointments   Date Time Provider Department Center   3/24/2025  2:30 PM [facility] 2 [facility]   3/24/2025  3:00 PM [facility] 1 [facility]   5/5/2025  9:00 AM Elizabet Marlow MD [facility] DEP       Requested Prescriptions     Pending Prescriptions Disp Refills    levothyroxine (SYNTHROID) 75 MCG tablet [Pharmacy Med Name: LEVOTHYROXINE 75 MCG TABLET] 90 tablet 3     Sig: TAKE ONE TABLET BY MOUTH ONE TIME A DAY BEFORE BREAKFAST       LAST ORDERED: 2/5/24    Lab Results   Component Value Date    TSH 0.73 02/01/2024           Arianna \"Carlito\" FARHAT Brooks

## 2025-03-25 ENCOUNTER — HOSPITAL ENCOUNTER (OUTPATIENT)
Facility: HOSPITAL | Age: 65
Discharge: HOME | End: 2025-03-28
Payer: MEDICARE

## 2025-03-25 VITALS — WEIGHT: 158 LBS | BODY MASS INDEX: 28.9 KG/M2

## 2025-03-25 DIAGNOSIS — Z12.31 VISIT FOR SCREENING MAMMOGRAM: ICD-10-CM

## 2025-03-25 PROCEDURE — 77063 BREAST TOMOSYNTHESIS BI: CPT

## 2025-04-24 ENCOUNTER — TELEPHONE (OUTPATIENT)
Facility: CLINIC | Age: 65
End: 2025-04-24

## 2025-04-24 SDOH — HEALTH STABILITY: PHYSICAL HEALTH: ON AVERAGE, HOW MANY MINUTES DO YOU ENGAGE IN EXERCISE AT THIS LEVEL?: 60 MIN

## 2025-04-24 SDOH — HEALTH STABILITY: PHYSICAL HEALTH: ON AVERAGE, HOW MANY DAYS PER WEEK DO YOU ENGAGE IN MODERATE TO STRENUOUS EXERCISE (LIKE A BRISK WALK)?: 7 DAYS

## 2025-04-24 ASSESSMENT — LIFESTYLE VARIABLES
HOW OFTEN DO YOU HAVE A DRINK CONTAINING ALCOHOL: 3
HOW MANY STANDARD DRINKS CONTAINING ALCOHOL DO YOU HAVE ON A TYPICAL DAY: 1 OR 2
HOW OFTEN DO YOU HAVE A DRINK CONTAINING ALCOHOL: 2-4 TIMES A MONTH
HOW OFTEN DO YOU HAVE SIX OR MORE DRINKS ON ONE OCCASION: 1
HOW MANY STANDARD DRINKS CONTAINING ALCOHOL DO YOU HAVE ON A TYPICAL DAY: 1

## 2025-04-24 ASSESSMENT — PATIENT HEALTH QUESTIONNAIRE - PHQ9
1. LITTLE INTEREST OR PLEASURE IN DOING THINGS: NOT AT ALL
SUM OF ALL RESPONSES TO PHQ QUESTIONS 1-9: 0
2. FEELING DOWN, DEPRESSED OR HOPELESS: NOT AT ALL

## 2025-05-02 SDOH — ECONOMIC STABILITY: INCOME INSECURITY: IN THE LAST 12 MONTHS, WAS THERE A TIME WHEN YOU WERE NOT ABLE TO PAY THE MORTGAGE OR RENT ON TIME?: NO

## 2025-05-02 SDOH — ECONOMIC STABILITY: FOOD INSECURITY: WITHIN THE PAST 12 MONTHS, THE FOOD YOU BOUGHT JUST DIDN'T LAST AND YOU DIDN'T HAVE MONEY TO GET MORE.: NEVER TRUE

## 2025-05-02 SDOH — ECONOMIC STABILITY: FOOD INSECURITY: WITHIN THE PAST 12 MONTHS, YOU WORRIED THAT YOUR FOOD WOULD RUN OUT BEFORE YOU GOT MONEY TO BUY MORE.: PATIENT DECLINED

## 2025-05-02 SDOH — ECONOMIC STABILITY: TRANSPORTATION INSECURITY
IN THE PAST 12 MONTHS, HAS LACK OF TRANSPORTATION KEPT YOU FROM MEETINGS, WORK, OR FROM GETTING THINGS NEEDED FOR DAILY LIVING?: NO

## 2025-05-02 SDOH — ECONOMIC STABILITY: TRANSPORTATION INSECURITY
IN THE PAST 12 MONTHS, HAS THE LACK OF TRANSPORTATION KEPT YOU FROM MEDICAL APPOINTMENTS OR FROM GETTING MEDICATIONS?: NO

## 2025-05-05 ENCOUNTER — OFFICE VISIT (OUTPATIENT)
Facility: CLINIC | Age: 65
End: 2025-05-05
Payer: MEDICARE

## 2025-05-05 VITALS
WEIGHT: 164 LBS | HEART RATE: 61 BPM | OXYGEN SATURATION: 97 % | DIASTOLIC BLOOD PRESSURE: 91 MMHG | HEIGHT: 62 IN | SYSTOLIC BLOOD PRESSURE: 153 MMHG | BODY MASS INDEX: 30.18 KG/M2

## 2025-05-05 DIAGNOSIS — Z00.00 MEDICARE ANNUAL WELLNESS VISIT, SUBSEQUENT: Primary | ICD-10-CM

## 2025-05-05 DIAGNOSIS — R03.0 ELEVATED BLOOD PRESSURE READING: ICD-10-CM

## 2025-05-05 DIAGNOSIS — E03.9 ACQUIRED HYPOTHYROIDISM: ICD-10-CM

## 2025-05-05 DIAGNOSIS — M85.89 OTHER SPECIFIED DISORDERS OF BONE DENSITY AND STRUCTURE, MULTIPLE SITES: ICD-10-CM

## 2025-05-05 PROCEDURE — G8427 DOCREV CUR MEDS BY ELIG CLIN: HCPCS | Performed by: INTERNAL MEDICINE

## 2025-05-05 PROCEDURE — G0438 PPPS, INITIAL VISIT: HCPCS | Performed by: INTERNAL MEDICINE

## 2025-05-05 PROCEDURE — 99214 OFFICE O/P EST MOD 30 MIN: CPT | Performed by: INTERNAL MEDICINE

## 2025-05-05 PROCEDURE — 1090F PRES/ABSN URINE INCON ASSESS: CPT | Performed by: INTERNAL MEDICINE

## 2025-05-05 PROCEDURE — G8417 CALC BMI ABV UP PARAM F/U: HCPCS | Performed by: INTERNAL MEDICINE

## 2025-05-05 PROCEDURE — 1123F ACP DISCUSS/DSCN MKR DOCD: CPT | Performed by: INTERNAL MEDICINE

## 2025-05-05 PROCEDURE — 3017F COLORECTAL CA SCREEN DOC REV: CPT | Performed by: INTERNAL MEDICINE

## 2025-05-05 PROCEDURE — 1159F MED LIST DOCD IN RCRD: CPT | Performed by: INTERNAL MEDICINE

## 2025-05-05 PROCEDURE — G8400 PT W/DXA NO RESULTS DOC: HCPCS | Performed by: INTERNAL MEDICINE

## 2025-05-05 PROCEDURE — 1036F TOBACCO NON-USER: CPT | Performed by: INTERNAL MEDICINE

## 2025-05-05 PROCEDURE — 1126F AMNT PAIN NOTED NONE PRSNT: CPT | Performed by: INTERNAL MEDICINE

## 2025-05-05 NOTE — PATIENT INSTRUCTIONS
Please bring in a copy of your advanced medical directive/living will so we and the hospital will have it on file.     I recommend you have Shingrix vaccine, at the pharmacy.     For healthy diet: I recommend you follow a mostly plant-based diet, high in monounsaturated fats (olive oil, canola oil, nuts, for example), high in vegetables and whole grains. You should avoid processed foods and meats (if you cannot pronounce most of the ingredients in an item you purchase, we should avoid eating it in general).     When checking your blood pressure at home:   Sit for five minutes with your feet on the floor and your back supported and an empty bladder, prior to checking your blood pressure.   Check your blood pressure at different times of the day and write down the readings.     Bring the machine and the readings to me for review.   Send readings in about a month.  The goal is to be under 130/80 at home.    If you need a new blood pressure cuff, go to--www.validatebp.org-- to find a blood pressure cuff model which is validated as accurate. You may then order that specific model.         Fatigue: Care Instructions  Overview     Fatigue is a feeling of tiredness, exhaustion, or lack of energy. You may feel fatigue because of too much or not enough activity. It can also come from stress, lack of sleep, boredom, and poor diet. Many medical problems, such as viral infections, can cause fatigue. Emotional problems, especially depression, are often the cause of fatigue.  Fatigue is most often a symptom of another problem. Treatment for fatigue depends on the cause. For example, if you have fatigue because you have a certain health problem, treating this problem also treats your fatigue. If depression or anxiety is the cause, treatment may help.  Follow-up care is a key part of your treatment and safety. Be sure to make and go to all appointments, and call your doctor if you are having problems. It's also a good idea to know

## 2025-05-05 NOTE — PROGRESS NOTES
Medicare Annual Wellness Visit    Kathryn Tam is here for Medicare AWV    Assessment & Plan     1. Medicare annual wellness visit, subsequent  We discussed the benefits of advanced care planning today. I gave the patient an information packet and instructions for instituting an advanced medical directive. We discussed the importance of and elements of a healthy diet.  Most immunizations are up to date; Shingrix recommended. Colonoscopy is due again in 2029. Continue to follow up here every six months, at least, and with specialists as directed.     2. Acquired hypothyroidism  Clinically euthyroid. Will continue current level of thyroid replacement, pending labs.   - TSH; Future  - Lipid Panel; Future  - Comprehensive Metabolic Panel; Future    3. Elevated blood pressure reading  Again with elevated blood pressure, but it did not decrease on repeat today.  I asked her to begin monitoring blood pressure at home and bring her machine and readings to her follow-up appointment.  She should send me blood pressures in 2 weeks.  I am also encouraging her to continue to be physically active, follow a low-sodium DASH diet.  Will obtain metabolic panel and CBC today with labs.  - Comprehensive Metabolic Panel; Future  - CBC with Auto Differential; Future    4. Other specified disorders of bone density and structure, multiple sites  She should have a bone density test to screen for osteoporosis and fracture risk. Will make specific recommendations after the test.   - DEXA BONE DENSITY AXIAL SKELETON; Future          Return in about 6 months (around 11/5/2025) for Blood pressure.     Subjective         Kathryn Tam is here for a wellness visit.    Health maintenance: She has been seen by the following providers in the last year: dermatology, optometry. She is exercising 60 minutes each time for 7 days a week. Last colon cancer screening was colonoscopy done in May of 2024. Does not note decrease in hearing. Has had no falls in

## 2025-05-06 LAB
ALBUMIN SERPL-MCNC: 4.1 G/DL (ref 3.5–5)
ALBUMIN/GLOB SERPL: 1.5 (ref 1.1–2.2)
ALP SERPL-CCNC: 59 U/L (ref 45–117)
ALT SERPL-CCNC: 28 U/L (ref 12–78)
ANION GAP SERPL CALC-SCNC: 4 MMOL/L (ref 2–12)
AST SERPL-CCNC: 14 U/L (ref 15–37)
BASOPHILS # BLD: 0.06 K/UL (ref 0–0.1)
BASOPHILS NFR BLD: 0.8 % (ref 0–1)
BILIRUB SERPL-MCNC: 0.4 MG/DL (ref 0.2–1)
BUN SERPL-MCNC: 16 MG/DL (ref 6–20)
BUN/CREAT SERPL: 29 (ref 12–20)
CALCIUM SERPL-MCNC: 9.5 MG/DL (ref 8.5–10.1)
CHLORIDE SERPL-SCNC: 104 MMOL/L (ref 97–108)
CHOLEST SERPL-MCNC: 198 MG/DL
CO2 SERPL-SCNC: 30 MMOL/L (ref 21–32)
CREAT SERPL-MCNC: 0.55 MG/DL (ref 0.55–1.02)
DIFFERENTIAL METHOD BLD: ABNORMAL
EOSINOPHIL # BLD: 0.16 K/UL (ref 0–0.4)
EOSINOPHIL NFR BLD: 2.1 % (ref 0–7)
ERYTHROCYTE [DISTWIDTH] IN BLOOD BY AUTOMATED COUNT: 12.2 % (ref 11.5–14.5)
GLOBULIN SER CALC-MCNC: 2.8 G/DL (ref 2–4)
GLUCOSE SERPL-MCNC: 98 MG/DL (ref 65–100)
HCT VFR BLD AUTO: 42.5 % (ref 35–47)
HDLC SERPL-MCNC: 53 MG/DL
HDLC SERPL: 3.7 (ref 0–5)
HGB BLD-MCNC: 13.9 G/DL (ref 11.5–16)
IMM GRANULOCYTES # BLD AUTO: 0.05 K/UL (ref 0–0.04)
IMM GRANULOCYTES NFR BLD AUTO: 0.7 % (ref 0–0.5)
LDLC SERPL CALC-MCNC: 120.4 MG/DL (ref 0–100)
LYMPHOCYTES # BLD: 2.35 K/UL (ref 0.8–3.5)
LYMPHOCYTES NFR BLD: 31.3 % (ref 12–49)
MCH RBC QN AUTO: 29 PG (ref 26–34)
MCHC RBC AUTO-ENTMCNC: 32.7 G/DL (ref 30–36.5)
MCV RBC AUTO: 88.7 FL (ref 80–99)
MONOCYTES # BLD: 0.45 K/UL (ref 0–1)
MONOCYTES NFR BLD: 6 % (ref 5–13)
NEUTS SEG # BLD: 4.45 K/UL (ref 1.8–8)
NEUTS SEG NFR BLD: 59.1 % (ref 32–75)
NRBC # BLD: 0 K/UL (ref 0–0.01)
NRBC BLD-RTO: 0 PER 100 WBC
PLATELET # BLD AUTO: 182 K/UL (ref 150–400)
PMV BLD AUTO: 12.7 FL (ref 8.9–12.9)
POTASSIUM SERPL-SCNC: 4.2 MMOL/L (ref 3.5–5.1)
PROT SERPL-MCNC: 6.9 G/DL (ref 6.4–8.2)
RBC # BLD AUTO: 4.79 M/UL (ref 3.8–5.2)
SODIUM SERPL-SCNC: 138 MMOL/L (ref 136–145)
TRIGL SERPL-MCNC: 123 MG/DL
TSH SERPL DL<=0.05 MIU/L-ACNC: 1.56 UIU/ML (ref 0.36–3.74)
VLDLC SERPL CALC-MCNC: 24.6 MG/DL
WBC # BLD AUTO: 7.5 K/UL (ref 3.6–11)

## 2025-05-07 ENCOUNTER — RESULTS FOLLOW-UP (OUTPATIENT)
Facility: CLINIC | Age: 65
End: 2025-05-07

## 2025-05-07 DIAGNOSIS — E55.9 VITAMIN D DEFICIENCY: ICD-10-CM

## 2025-05-07 DIAGNOSIS — E55.9 VITAMIN D DEFICIENCY: Primary | ICD-10-CM

## 2025-05-08 ENCOUNTER — RESULTS FOLLOW-UP (OUTPATIENT)
Facility: CLINIC | Age: 65
End: 2025-05-08

## 2025-05-08 LAB — 25(OH)D3 SERPL-MCNC: 28.9 NG/ML (ref 30–100)

## (undated) DEVICE — KIT COLON W/ 1.1OZ LUB AND 2 END

## (undated) DEVICE — BASIN EMSIS 16OZ GRAPHITE PLAS KID SHP MOLD GRAD FOR ORAL

## (undated) DEVICE — CANN NASAL O2 CAPNOGRAPHY AD -- FILTERLINE

## (undated) DEVICE — NDL FLTR TIP 5 MIC 18GX1.5IN --

## (undated) DEVICE — Device

## (undated) DEVICE — CATH IV AUTOGRD BC BLU 22GA 25 -- INSYTE

## (undated) DEVICE — ADULT SPO2 SENSOR: Brand: NELLCOR

## (undated) DEVICE — KENDALL RADIOLUCENT FOAM MONITORING ELECTRODE -RECTANGULAR SHAPE: Brand: KENDALL

## (undated) DEVICE — SYR 5ML 1/5 GRAD LL NSAF LF --

## (undated) DEVICE — SET ADMIN 16ML TBNG L100IN 2 Y INJ SITE IV PIGGY BK DISP

## (undated) DEVICE — 1200 GUARD II KIT W/5MM TUBE W/O VAC TUBE: Brand: GUARDIAN

## (undated) DEVICE — SOLIDIFIER MEDC 1200ML -- CONVERT TO 356117

## (undated) DEVICE — SYR 3ML LL TIP 1/10ML GRAD --

## (undated) DEVICE — BAG BELONG PT PERS CLEAR HANDL

## (undated) DEVICE — NDL PRT INJ NSAF BLNT 18GX1.5 --